# Patient Record
Sex: MALE | Race: WHITE | NOT HISPANIC OR LATINO | Employment: OTHER | URBAN - METROPOLITAN AREA
[De-identification: names, ages, dates, MRNs, and addresses within clinical notes are randomized per-mention and may not be internally consistent; named-entity substitution may affect disease eponyms.]

---

## 2023-12-21 ENCOUNTER — APPOINTMENT (OUTPATIENT)
Dept: ORTHOPEDIC SURGERY | Facility: CLINIC | Age: 63
End: 2023-12-21

## 2024-01-30 ENCOUNTER — PATIENT MESSAGE (OUTPATIENT)
Dept: ORTHOPEDIC SURGERY | Facility: CLINIC | Age: 64
End: 2024-01-30
Payer: MEDICARE

## 2024-01-30 DIAGNOSIS — M76.32 IT BAND SYNDROME, LEFT: Primary | ICD-10-CM

## 2024-02-01 ENCOUNTER — TELEPHONE (OUTPATIENT)
Dept: ORTHOPEDIC SURGERY | Facility: HOSPITAL | Age: 64
End: 2024-02-01
Payer: MEDICARE

## 2024-02-01 RX ORDER — LISINOPRIL 20 MG/1
TABLET ORAL
COMMUNITY

## 2024-02-01 RX ORDER — CHOLECALCIFEROL (VITAMIN D3) 25 MCG
1 TABLET ORAL DAILY
COMMUNITY

## 2024-02-01 RX ORDER — NAPROXEN SODIUM 220 MG/1
TABLET, FILM COATED ORAL
COMMUNITY

## 2024-02-01 RX ORDER — PANTOPRAZOLE SODIUM 20 MG/1
TABLET, DELAYED RELEASE ORAL
COMMUNITY
Start: 2020-02-25

## 2024-02-01 RX ORDER — VALACYCLOVIR HYDROCHLORIDE 1 G/1
TABLET, FILM COATED ORAL
COMMUNITY
Start: 2019-06-19

## 2024-02-01 RX ORDER — AMITRIPTYLINE HYDROCHLORIDE 25 MG/1
TABLET, FILM COATED ORAL
COMMUNITY
Start: 2020-06-25

## 2024-02-01 RX ORDER — LISINOPRIL 10 MG/1
TABLET ORAL
COMMUNITY
Start: 2019-04-04

## 2024-02-01 RX ORDER — METHOCARBAMOL 750 MG/1
TABLET, FILM COATED ORAL
COMMUNITY
Start: 2020-03-23

## 2024-02-01 RX ORDER — MULTIVIT-MIN/IRON FUM/FOLIC AC 7.5 MG-4
1 TABLET ORAL DAILY
COMMUNITY

## 2024-02-01 RX ORDER — MELOXICAM 15 MG/1
15 TABLET ORAL DAILY
Qty: 30 TABLET | Refills: 0 | OUTPATIENT
Start: 2024-02-01 | End: 2024-03-02

## 2024-02-01 RX ORDER — MUPIROCIN 20 MG/G
OINTMENT TOPICAL
COMMUNITY
Start: 2020-02-06

## 2024-02-01 RX ORDER — MELOXICAM 15 MG/1
TABLET ORAL
COMMUNITY
Start: 2021-03-19 | End: 2024-02-01 | Stop reason: SDUPTHER

## 2024-02-01 RX ORDER — DOCUSATE SODIUM 100 MG/1
CAPSULE, LIQUID FILLED ORAL
COMMUNITY
Start: 2020-02-25

## 2024-02-01 RX ORDER — TRIAMCINOLONE ACETONIDE 40 MG/ML
INJECTION, SUSPENSION INTRA-ARTICULAR; INTRAMUSCULAR
COMMUNITY
Start: 2013-11-03

## 2024-02-01 RX ORDER — GEMFIBROZIL 600 MG/1
TABLET, FILM COATED ORAL
COMMUNITY
Start: 2013-04-08

## 2024-02-01 RX ORDER — GABAPENTIN 600 MG/1
1 TABLET ORAL 3 TIMES DAILY
COMMUNITY
Start: 2020-09-09

## 2024-02-01 RX ORDER — ATORVASTATIN CALCIUM 80 MG/1
TABLET, FILM COATED ORAL
COMMUNITY
Start: 2019-04-04

## 2024-02-01 RX ORDER — FELODIPINE 10 MG/1
TABLET, EXTENDED RELEASE ORAL
COMMUNITY
Start: 2019-04-04

## 2024-02-01 RX ORDER — METFORMIN HYDROCHLORIDE 850 MG/1
TABLET ORAL
COMMUNITY

## 2024-02-01 RX ORDER — MELOXICAM 15 MG/1
15 TABLET ORAL DAILY
Qty: 30 TABLET | Refills: 0 | Status: SHIPPED | OUTPATIENT
Start: 2024-02-01 | End: 2024-03-02

## 2024-02-01 RX ORDER — LISINOPRIL AND HYDROCHLOROTHIAZIDE 20; 25 MG/1; MG/1
TABLET ORAL
COMMUNITY
Start: 2013-07-11

## 2024-04-09 ENCOUNTER — TELEPHONE (OUTPATIENT)
Dept: ORTHOPEDIC SURGERY | Facility: HOSPITAL | Age: 64
End: 2024-04-09

## 2024-04-09 ENCOUNTER — OFFICE VISIT (OUTPATIENT)
Dept: ORTHOPEDIC SURGERY | Facility: HOSPITAL | Age: 64
End: 2024-04-09
Payer: MEDICARE

## 2024-04-09 ENCOUNTER — HOSPITAL ENCOUNTER (OUTPATIENT)
Dept: RADIOLOGY | Facility: HOSPITAL | Age: 64
Discharge: HOME | End: 2024-04-09
Payer: MEDICARE

## 2024-04-09 DIAGNOSIS — M76.32 IT BAND SYNDROME, LEFT: Primary | ICD-10-CM

## 2024-04-09 DIAGNOSIS — G89.29 CHRONIC PAIN OF LEFT KNEE: ICD-10-CM

## 2024-04-09 DIAGNOSIS — M25.562 CHRONIC PAIN OF LEFT KNEE: ICD-10-CM

## 2024-04-09 DIAGNOSIS — M25.562 LEFT KNEE PAIN, UNSPECIFIED CHRONICITY: ICD-10-CM

## 2024-04-09 PROCEDURE — 73564 X-RAY EXAM KNEE 4 OR MORE: CPT | Mod: LT

## 2024-04-09 PROCEDURE — 1159F MED LIST DOCD IN RCRD: CPT | Performed by: FAMILY MEDICINE

## 2024-04-09 PROCEDURE — 99214 OFFICE O/P EST MOD 30 MIN: CPT | Performed by: FAMILY MEDICINE

## 2024-04-09 PROCEDURE — 73564 X-RAY EXAM KNEE 4 OR MORE: CPT | Mod: LEFT SIDE | Performed by: RADIOLOGY

## 2024-04-09 RX ORDER — MELOXICAM 15 MG/1
15 TABLET ORAL DAILY
Qty: 30 TABLET | Refills: 1 | OUTPATIENT
Start: 2024-04-09 | End: 2024-06-08

## 2024-04-09 RX ORDER — MELOXICAM 15 MG/1
15 TABLET ORAL DAILY
Qty: 30 TABLET | Refills: 1 | Status: SHIPPED | OUTPATIENT
Start: 2024-04-09 | End: 2024-06-08

## 2024-04-09 NOTE — PROGRESS NOTES
Sports Medicine Office Note    Today's Date:  04/09/2024     HPI: Asif Villarreal is a 63 y.o. 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     On 1/15/2021 he presented for evaluation of bilateral knee pain for possible ultrasound-guided injections upon referral by Dr. Perkins and his NP. Leonidas has been a competitive and recreational athlete for most of his life and has had early onset arthrosis of both knees. 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 He has had significant postoperative pain at the left knee, specifically at the IT band since his surgery. He has good friends with Dr. Blanchard who recently injected his right knee for arthritis. He got minimal relief from that injection. He has been diagnosed with CPRS and is receiving sympathetic blocks by Dr. Sewell. The first 1 gave him 6 to 7 weeks of complete pain relief and then his pain immediately returned. The second block gave him no benefit whatsoever. He recently underwent an MRI of the left knee for further evaluation of his chronic pain status post knee replacement. He has seen Brice Blanchard and Oumar both for additional opinions. Dr. Perkins also recommended that he see Dr. Huitron at [facility] for another opinion. He has not had any treatments at his iliotibial band. It sounds like he had hyper aggressive postoperative therapy when he went to PT last year. He has not had any recent therapy. He is very frustrated with his chronic pain and he reports it is debilitating. We had a long discussion regarding his chronic pain and agreed that he has severely tight iliotibial bands and tendinitis. He also has significant CRPS which seems to be incompletely treated at this time. We agreed upon a local cortisone injection at the distal left IT band. This produced immediate symptom relief and he had decreased allodynia, hyperalgesia and improved range of motion  without pain. We also agreed upon repeating a intra-articular cortisone injection at the right knee which also gave him great pain relief. He was extremely happy with his decreased pain and increased range of motion before leaving the exam room. He was able to walk without pain which she had not done for some time. We agreed to check inflammatory markers and a lab order was given. I strongly recommend that he start formal physical therapy and gave him an order, but I would like for him to check with Dr. Perkins to make sure he is okay with this recommendation. I also think that if his CRPS symptoms flare back up then he should definitely follow-up with Dr. Sewell. I will see him back in 4 weeks for recheck.     On 2/5/2021, he returns returns for 3-week follow-up of bilateral knee pain, left worse than right. Overall he feels better but there is still soreness. They are not as bad as they were previously. The left knee is without stiffness. He is riding the bicycle and walking around the block. There is still a large area that sensitive to touch around the lateral knee. He reports his right knee is still doing great from the cortisone injection. He went to one formal physical therapy setting and was advised to do home exercises. The therapist had emailed me with this recommendation. We also discussed his previous treatments and he does have gabapentin 600 mg but he only takes it occasionally. He denies interval injury or trauma. We discussed that he has had significant improvement with our conservative treatment plan since his last visit. He has responded very well to the cortisone injections both inside the right knee joint and outside the left knee. Still very concerned that he has complex regional pain syndrome at the left knee/CRPS and strongly recommend that he restart gabapentin daily and go back to formal physical therapy for treatment for this problem. I will email his therapist and if he does not treat  this we will find someone who will. I think this is important that we address through tactile desensitization. The right knee is doing very well. He understands that the cortisone injection can be repeated every 3 months or later as needed. His labs are all normal. He will follow-up in the next 1 month or sooner as needed.     On 3/19/2021, he returns for a 6-week follow-up of bilateral knee pain, left worse than right. Overall he feels much better than his previous visits. He reports the right knee is 50% better and the left knee is 20% better today. He was feeling better earlier in the week after having therapy. He feels the occupational therapy for the CRPS is helping greatly. He is taking the gabapentin 300 mg twice dailyâ€“3 times daily. He is not taking any anti-inflammatory at this time. His biggest complaint is swelling in his legs. When he wears compressive stockings this helps greatly. He recently saw his PCP and had blood work and was told that he did not have any arteriovascular disease. He denies interval injury or trauma. We agreed that he is doing much better than when I first met him in January. He has responded very well to her current conservative treatment plan. We need to get him on an anti-inflammatory and this was sent to the pharmacy. He will continue on the gabapentin. He will continue with occupational therapy and home exercises. Activity modifications were reviewed. I do feel that he has some type of venous insufficiency causing peripheral edema in both legs and that he should talk to his PCP at the [facility] about this. He might need to be on a diuretic or other medication. His labs are not available for my review.      On 6/17/2021, he returns for 3-month follow-up of chronic bilateral knee pain, left worse than right. He reports that his pain is gradually worsened over the last few weeks. The previous cortisone injections gave him 5+ months of great relief. He has been taking the gabapentin  and meloxicam as instructed. This is helping him sleep at night. He is wearing the compression socks at both legs. He denies interval injury or trauma. He is requesting analgesia for his chronic knee pain. We agreed to repeat a cortisone injection into his right knee to help with his chronic pain. He tolerated this well. Activity modifications were reviewed. He understands that this can be repeated every 3 months or later. We may want to consider viscosupplementation in the future.  Regarding his chronic left knee pain status post TKA, he understands I will not give him cortisone into the joint. We agreed that he does not need a repeat injection at the distal IT band at this time. He will continue with his oral meloxicam, oral gabapentin, and topical diclofenac that seems to be helping his symptoms. I strongly encouraged him to continue his treatment plan for CRPS as he is doing much better now than he was 6 months ago. I am happy to see him back in the next 3 months or as needed.     On 10/26/2021, he returns for recheck of chronic bilateral knee pain status post left knee replacement. The previous cortisone injection at the right knee given on 6/17/2021 has worn off and his pain has returned. He is having swelling. He is also getting pain down the distal IT band at the right. The left knee pain has returned but not nearly as bad as before his injection in January 2021. Previous cortisone injection gave him great relief for over 6 weeks and is now down to 60 to 65%. He is starting to get pain down the IT band. He would like injections at both knees. He would also like to come off his gabapentin if possible. He needs a refill of his meloxicam. He denies interval injury or trauma. We agreed to repeat a cortisone injection into his right knee to help with his chronic pain. He tolerated this well. Activity modifications were reviewed. He understands that this can be repeated every 3 months or later. We may want to  consider viscosupplementation in the future before ultimately undergoing knee replacement.  Regarding his chronic left knee pain status post TKA, we reviewed and he understands I will not give him cortisone into the joint. We agreed to repeat injection at the distal IT band at this time. Tolerated this well. He will continue with his oral meloxicam, oral gabapentin, and topical diclofenac that seems to be helping his symptoms. We agreed that his CRPS has improved but I do not feel that it has completely resolved upon his exam findings today. We will try a gradual stepdown of his gabapentin      On 7/14/2022, he returns for 9-month follow-up of chronic bilateral knee pain. He is requesting repeat treatment with cortisone injection similar to his previous visits. He is still having lateral left knee postoperative pain at the distal IT band. The right knee DJD is also flaring up with pain. He is scheduled to have replacement with Dr. Perkins in November. Today he would like long-term analgesia. He denies interval injury or trauma. He still playing golf regularly. He did stop the gabapentin 2 months ago that was helping his CRPS type I which has improved greatly. He is not having any of the allodynia or hyperalgesia.   We agreed to repeat a cortisone injection into his right knee to help with his chronic pain. He tolerated this well. Activity modifications were reviewed. He understands that this can be repeated every 3 months or later. He is scheduled in November 2022 to undergo knee replacement.  Regarding his chronic left knee pain status post TKA, we reviewed and he understands I will not give him cortisone into the joint. We agreed to repeat injection at the distal IT band at this time. He tolerated this well. He is doing well without gabapentin for the CRPS which, hopefully has resolved. We discussed PRP at the distal IT band and he would be an eligible candidate. He understands that this is not covered by  insurance and would be an out-of-pocket expense. He would like to consider this in early September after today's injection calms down his pain.  I am happy to see him back in the next 3 to 4 months or sooner as needed.     On 9/9/2022, he returns for follow-up of chronic left lateral knee pain status post total knee replacement and with chronic iliotibial band tendinitis, and to begin a trial of PRP to the area. He understands this is not covered by insurance and is an out-of-pocket expense. He reports the previous cortisone injection at his right knee at his last visit helped him greatly. The cortisone injection around the distal IT band at his last visit also helped greatly. This to help getting through his child's wedding celebration. He is now wanting to attempt long-term analgesia at the distal lateral right knee. He denies interval injury or trauma. He has continued to play golf and with Dr. Blanchard.   We agreed to start PRP treatment today. We were successful obtaining blood specimen out of the left antecubital fossa. PRP was injected into the lateral left knee around the distal iliotibial band without any complications. We reviewed the post procedure protocol including nonweightbearing, crutch assisted ambulation for 2 days and then progressing off the crutches as tolerated. He will follow-up in 6 weeks for recheck.     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 He is very happy with his improvement. He reports she is 85% better with her PRP treatment. He denies interval injury or trauma. He is scheduled to undergo right knee replacement within the next 10 days with Dr. Perkins.  We agreed that he responded very well to PRP injection over the lateral left knee. We discussed that this could possibly be repeated in the future depending on how long he gets relief. He will  continue his current conservative treatment plan otherwise. He will undergo right knee replacement with Dr. Perkins soon. I am happy to see him back when his pain returns.     Today, 4/9/2024, he returns for an 18-month follow-up of chronic lateral knee and thigh pain with distal IT band symptoms that we have treated successfully in the past with PRP.  He reports 16 to 18 months of great improvement after the PRP injection on 10/21/2022 for his chronic recalcitrant distal IT band pain status post left knee replacement.  He has been very active and playing lots of golf.  The pain has returned over the last 1 to 2 months without injury or trauma.  He reports meloxicam has helped greatly in addition to the shot, this gives him 50 to 70% improvement while taking it but he is out of the medicine.  He denies direct injury or trauma.  He has no problems with the right knee status post replacement with Dr. Perkins.    He has no other complaints.    Physical Examination:     The LEFT knee has trace joint effusion but is otherwise without obvious signs of acute bony deformity, swelling, erythema, or ecchymosis. The patella is no longer tender to light touch. The medial joint line is no longer tender and without bony crepitus or step-off. The lateral joint line is mildly tender and without bony crepitus or step-off. Flexion & extension are limited and painful. There is no obvious instability with stress testing.     Imaging:  Radiographs of the left knee obtained today were reviewed and revealed no acute bony abnormalities.  Prosthetic components are intact and anatomic.  These were compared to previous x-rays from 2022.  The studies were reviewed by me personally in the office today.    === 12/16/22 ===  KNEE  - Impression -  No hardware failure about right total knee arthroplasty.  Moderate-sized effusion      Problem List Items Addressed This Visit    None  Visit Diagnoses         Codes    It band syndrome, left    -   Primary M76.32    Relevant Orders    Referral to Physical Therapy    Chronic pain of left knee     M25.562, G89.29    Relevant Medications    meloxicam (Mobic) 15 mg tablet    Other Relevant Orders    XR knee left 4+ views    Referral to Physical Therapy    DANIEL with Reflex to ROBERT    CBC and Auto Differential    C-Reactive Protein    Sedimentation Rate    Rheumatoid Factor            Assessment and Plan:     We reviewed the exam and x-ray findings and discussed the conservative and surgical treatment options. We agreed to treat him conservatively with a referral to formal physical therapy and to restart meloxicam daily.  We did review the possibility of repeating PRP and I explained to him that the price has gone up but also the current version gives a 2-3 times the amount of concentrated blood products available to help with his symptoms.  He understands this is still not covered by insurance and would be an out-of-pocket expense.  I like for him to wait a minimum of 6 to 8 weeks before we proceed with this option.  He also asked about this being inflammatory arthritis; Dr. Blanchard mention to him about this several times.  He would like to get lab work checked.  Will go ahead and obtain labs and he can follow-up with his PCP or rheumatology as needed.    **This note was dictated using Dragon speech recognition software and was not corrected for spelling or grammatical errors**.    Lloyd Perez MD  Sports Medicine Specialist  [facility] [place]

## 2024-04-09 NOTE — TELEPHONE ENCOUNTER
Patient just called and his rx was sent to the [place] pharmacy from his last rx no problem I added a new one to his local pharmacy,?

## 2024-04-16 ENCOUNTER — LAB (OUTPATIENT)
Dept: LAB | Facility: LAB | Age: 64
End: 2024-04-16
Payer: MEDICARE

## 2024-04-16 DIAGNOSIS — G89.29 CHRONIC PAIN OF LEFT KNEE: ICD-10-CM

## 2024-04-16 DIAGNOSIS — M25.562 CHRONIC PAIN OF LEFT KNEE: ICD-10-CM

## 2024-04-16 LAB
BASOPHILS # BLD AUTO: 0.06 X10*3/UL (ref 0–0.1)
BASOPHILS NFR BLD AUTO: 0.8 %
CRP SERPL-MCNC: 0.11 MG/DL
EOSINOPHIL # BLD AUTO: 0.22 X10*3/UL (ref 0–0.7)
EOSINOPHIL NFR BLD AUTO: 3 %
ERYTHROCYTE [DISTWIDTH] IN BLOOD BY AUTOMATED COUNT: 12.9 % (ref 11.5–14.5)
ERYTHROCYTE [SEDIMENTATION RATE] IN BLOOD BY WESTERGREN METHOD: 11 MM/H (ref 0–20)
HCT VFR BLD AUTO: 44 % (ref 41–52)
HGB BLD-MCNC: 14.3 G/DL (ref 13.5–17.5)
IMM GRANULOCYTES # BLD AUTO: 0.03 X10*3/UL (ref 0–0.7)
IMM GRANULOCYTES NFR BLD AUTO: 0.4 % (ref 0–0.9)
LYMPHOCYTES # BLD AUTO: 1.87 X10*3/UL (ref 1.2–4.8)
LYMPHOCYTES NFR BLD AUTO: 25.3 %
MCH RBC QN AUTO: 29.6 PG (ref 26–34)
MCHC RBC AUTO-ENTMCNC: 32.5 G/DL (ref 32–36)
MCV RBC AUTO: 91 FL (ref 80–100)
MONOCYTES # BLD AUTO: 0.82 X10*3/UL (ref 0.1–1)
MONOCYTES NFR BLD AUTO: 11.1 %
NEUTROPHILS # BLD AUTO: 4.38 X10*3/UL (ref 1.2–7.7)
NEUTROPHILS NFR BLD AUTO: 59.4 %
NRBC BLD-RTO: 0 /100 WBCS (ref 0–0)
PLATELET # BLD AUTO: 299 X10*3/UL (ref 150–450)
RBC # BLD AUTO: 4.83 X10*6/UL (ref 4.5–5.9)
RHEUMATOID FACT SER NEPH-ACNC: <10 IU/ML (ref 0–15)
WBC # BLD AUTO: 7.4 X10*3/UL (ref 4.4–11.3)

## 2024-04-16 PROCEDURE — 85025 COMPLETE CBC W/AUTO DIFF WBC: CPT

## 2024-04-16 PROCEDURE — 86140 C-REACTIVE PROTEIN: CPT

## 2024-04-16 PROCEDURE — 86431 RHEUMATOID FACTOR QUANT: CPT

## 2024-04-16 PROCEDURE — 85652 RBC SED RATE AUTOMATED: CPT

## 2024-04-16 PROCEDURE — 86038 ANTINUCLEAR ANTIBODIES: CPT

## 2024-04-16 PROCEDURE — 36415 COLL VENOUS BLD VENIPUNCTURE: CPT

## 2024-04-17 LAB — ANA SER QL HEP2 SUBST: NEGATIVE

## 2024-04-25 ENCOUNTER — EVALUATION (OUTPATIENT)
Dept: PHYSICAL THERAPY | Facility: CLINIC | Age: 64
End: 2024-04-25
Payer: MEDICARE

## 2024-04-25 DIAGNOSIS — G89.29 CHRONIC PAIN OF LEFT KNEE: ICD-10-CM

## 2024-04-25 DIAGNOSIS — M76.32 IT BAND SYNDROME, LEFT: Primary | ICD-10-CM

## 2024-04-25 DIAGNOSIS — M25.562 CHRONIC PAIN OF LEFT KNEE: ICD-10-CM

## 2024-04-25 PROCEDURE — 97110 THERAPEUTIC EXERCISES: CPT | Mod: GP

## 2024-04-25 PROCEDURE — 97162 PT EVAL MOD COMPLEX 30 MIN: CPT | Mod: GP

## 2024-04-25 ASSESSMENT — PAIN - FUNCTIONAL ASSESSMENT: PAIN_FUNCTIONAL_ASSESSMENT: 0-10

## 2024-04-25 ASSESSMENT — ENCOUNTER SYMPTOMS
DEPRESSION: 0
OCCASIONAL FEELINGS OF UNSTEADINESS: 0
LOSS OF SENSATION IN FEET: 0

## 2024-04-25 ASSESSMENT — PAIN SCALES - GENERAL: PAINLEVEL_OUTOF10: 4

## 2024-04-25 NOTE — PROGRESS NOTES
Physical Therapy  Physical Therapy Orthopedic Evaluation    Patient Name: Asif Villarreal  MRN: [id number]  Today's Date: 4/25/2024  Time Calculation  Start Time: 0815  Stop Time: 0900  Time Calculation (min): 45  PT Evaluation Time Entry  PT Evaluation (Mod) Time Entry: 30  PT Therapeutic Procedures Time Entry  Therapeutic Exercise Time Entry: 10  Manual Therapy: 5                   Insurance:  Visit number: 1 of [place]  Authorization info: No auth  Insurance Type: Payor: MEDICARE / Plan: MEDICARE PART A AND B / Product Type: *No Product type* /      Current Problem  1. It band syndrome, left  Referral to Physical Therapy    Follow Up In Physical Therapy      2. Chronic pain of left knee  Referral to Physical Therapy    Follow Up In Physical Therapy          Referred by: Dr. Perez    General:  General  Reason for Referral: Left knee pain  Referred By: Dr. Perez    Precautions:  Precautions  Precautions Comment: CRPS history  Medical History Form: Reviewed (scanned into chart)    Subjective:   Subjective   Chief Complaint: patient presents w/ L knee pain along the lateral aspect. Has a PMH of CRPS post L TKA. He has done much better in recent months since he started receiving PRP injections. Recently purchased a home in [place] and has been golfing [place] west. About 1-2 months ago he started to experience pain and stiffness in his left lateral knee. Does not recall a new DOC and saw Dr. Perez on 4/9.   Onset: 02/01/2024 approximate date  DOC: insidious    Current Condition:   Better    Pain:  Pain Assessment: 0-10  Pain Score: 4  Location: L knee  Description: stiffness  Aggravating Factors:  Bending/Straightening Knee, weather change since coming back to [place].  Relieving Factors:  meloxicam     Relevant Information (PMH & Previous Tests/Imaging): B TKA, CRPS left knee.   Previous Interventions/Treatments: Physical Therapy    Prior Level of Function (PLOF)  Patient previously independent with all  ADLs  Exercise/Physical Activity: golf, walking and rec sports   Work/School: retired    Patients Living Environment: Reviewed and no concern  Primary Language: English  Patient's Goal(s) for Therapy: eliminate pain     Red Flags: Do you have any of the following? No  Fever/chills, unexplained weight changes, dizziness/fainting, unexplained change in bowel or bladder functions, unexplained malaise or muscle weakness, night pain/sweats, numbness or tingling    Objective:    ROM    Hip AROM (Degrees)      (R)  (L)  Flexion: wfl  wfl   Extension: wfl  wfl     Abduction: wfl  wfl     Adduction: wfl  wfl     ER:  wfl  wfl     IR:  wfl  wfl         Knee AROM (Degrees)      (R)  (L)  Flexion: 125  115      Extension: +2  +2         Knee PROM (Degrees)      (R)  (L)  Flexion: 125  115*      Extension: +2  +2     *guarding/empty end feel    Ankle AROM (Degrees)      (R)  (L)  Plantarflexion: wfl  wfl      Dorsiflexion: wfl  wfl     Inversion: wfl  wfl      Eversion: wfl  wfl            Strength Testing    Hip    (R)  (L)  Flexion: 5/5  5/5      Extension: 5/5  5/5     Abduction: 5/5  4/5 + pain            Knee    (R)  (L)  Flexion: 5/5  5/5      Extension: 5/5  5/5 + pain           Functional Screening  Deferred   Squat:   Lunge:   SL Balance:   Lateral Step Down:   SL Quarter Squat:       Posture: kyphotic       Palpation: TTP over ITB insertion and along entire aspect up to insertion at hip.       Flexibility:   Elys: Pos L due to hips lifting off table  Eron:   Obers: Pos B due to hips not reaching parallel  Mirta: Neg B    Patella Mobility: Min-mod limitation in all directions LLE that improved w/ mobs      Observation: Mild/moderate LLE ankle edema. Increased tone throughout LLE. Patient states that he feels his muscle bulk is unusual for his age and that he does not work out his lower body w/ weights.     Gait: flexed trunk         Special Tests  Estelle compression: Pos due to pain reproduction over ITB  insertion.      Outcome Measures:  Other Measures  Lower Extremity Funtional Score (LEFS): 36/80     Treatments:    There-ex: 10'  Supine ankle pumps 2x20*  Supine IT band SLR stretch w/ strap assist 4x30'*  Hooklying hip abduction w/ blute TB 2x20*    * = added to HEP.  Sheet with exercise descriptions and images issued.  Skilled intervention utilized in the appropriate selection & application of above exercises.  Verbal and tactile cues provided for proper form and technique.  Pt. demonstrated appropriate form & verbalized understanding of optimal technique for above exercises.     Manual: 5'  PFJ mobs all directions    EDUCATION: Home exercise program, plan of care, activity modifications, pain management, and injury pathology       Assessment: Patient presents with signs and symptoms consistent with left IT band syndrome, resulting in limited participation in pain-free ADLs and inability to perform at their prior level of function. Pt would benefit from physical therapy to address the impairments found & listed previously in the objective section in order to return to safe and pain-free ADLs and prior level of function.       Plan:  PT Plan: Skilled PT  PT Frequency: 1 time per week  Duration: 4  Onset Date: 04/25/24  Certification Period Start Date: 07/24/24  Rehab Potential: Fair (due to hx of CRPS)  Plan of Care Agreement: Patient  Planned Interventions include: therapeutic exercise, self-care home management, manual therapy, therapeutic activities, gait training, neuromuscular coordination, vasopneumatic, dry needling, aquatic therapy  Frequency: 1 x Week  Duration: 4 Weeks  Goals: Set and discussed today  Active       Left IT band syndrome       STGs       Start:  04/25/24    Expected End:  05/23/24       1. Patient will demonstrate independence w/ HEP to allow for self-management of symptoms  2. Patient will demonstrate increased strength during hip abduction and knee extension MMT to 5/5+ pain free to  improve ADL performance.   3. Patient will demonstrate improved knee flexion ROM to equal unaffected side + pain free to improve ADLs.   4. Patient will report a decrease in pain by at least 2 points to meet the MCID  5. Patient will tolerate golfing 9 holes w/ a reported decrease in symptoms to progress towards long term goal          LTGs       Start:  04/25/24    Expected End:  06/20/24       1. Patient will demonstrate improved flexibility by having a negative Obers test B to decrease tension on the IT band during functional mobility.   2. Patient will be able to reciprocally negotiate stairs w/o a reproduction of familiar symptoms to improve ADL performance and demonstrate an improved capacity for functional mobility.   3. Patient will report an improved score on LEFS by at least 9 points to meet the MDC  4. Patient will demonstrate improved quadriceps flexibility by having a negative Ely's test to reduce knee tension and improve functional mobility.   5. Patient will tolerate golfing a full round of 18 w/o a return of familiar symptoms to demonstrate a return to PLOF.                   Plan of care was developed with input and agreement by the patient      Chevy Alfonso, PT, ATC

## 2024-05-06 ENCOUNTER — OFFICE VISIT (OUTPATIENT)
Dept: ORTHOPEDIC SURGERY | Facility: CLINIC | Age: 64
End: 2024-05-06
Payer: MEDICARE

## 2024-05-06 VITALS — HEIGHT: 72 IN | WEIGHT: 270 LBS | BODY MASS INDEX: 36.57 KG/M2

## 2024-05-06 DIAGNOSIS — M65.331 TRIGGER FINGER, RIGHT MIDDLE FINGER: ICD-10-CM

## 2024-05-06 DIAGNOSIS — M65.332 TRIGGER FINGER, LEFT MIDDLE FINGER: ICD-10-CM

## 2024-05-06 DIAGNOSIS — G56.03 CARPAL TUNNEL SYNDROME, BILATERAL: Primary | ICD-10-CM

## 2024-05-06 PROCEDURE — 20550 NJX 1 TENDON SHEATH/LIGAMENT: CPT | Performed by: ORTHOPAEDIC SURGERY

## 2024-05-06 PROCEDURE — 99213 OFFICE O/P EST LOW 20 MIN: CPT | Performed by: ORTHOPAEDIC SURGERY

## 2024-05-06 PROCEDURE — 1159F MED LIST DOCD IN RCRD: CPT | Performed by: ORTHOPAEDIC SURGERY

## 2024-05-06 PROCEDURE — 1036F TOBACCO NON-USER: CPT | Performed by: ORTHOPAEDIC SURGERY

## 2024-05-06 PROCEDURE — 20526 THER INJECTION CARP TUNNEL: CPT | Performed by: ORTHOPAEDIC SURGERY

## 2024-05-06 RX ORDER — LIDOCAINE HYDROCHLORIDE 10 MG/ML
0.5 INJECTION INFILTRATION; PERINEURAL
Status: COMPLETED | OUTPATIENT
Start: 2024-05-06 | End: 2024-05-06

## 2024-05-06 RX ORDER — TRIAMCINOLONE ACETONIDE 40 MG/ML
20 INJECTION, SUSPENSION INTRA-ARTICULAR; INTRAMUSCULAR
Status: COMPLETED | OUTPATIENT
Start: 2024-05-06 | End: 2024-05-06

## 2024-05-06 RX ADMIN — TRIAMCINOLONE ACETONIDE 20 MG: 40 INJECTION, SUSPENSION INTRA-ARTICULAR; INTRAMUSCULAR at 18:11

## 2024-05-06 RX ADMIN — LIDOCAINE HYDROCHLORIDE 0.5 ML: 10 INJECTION INFILTRATION; PERINEURAL at 18:11

## 2024-05-06 ASSESSMENT — PAIN DESCRIPTION - DESCRIPTORS: DESCRIPTORS: ACHING;BURNING;SORE

## 2024-05-06 ASSESSMENT — PAIN - FUNCTIONAL ASSESSMENT: PAIN_FUNCTIONAL_ASSESSMENT: 0-10

## 2024-05-06 ASSESSMENT — PAIN SCALES - GENERAL: PAINLEVEL_OUTOF10: 5 - MODERATE PAIN

## 2024-05-06 NOTE — PROGRESS NOTES
[facility]  Hand and Upper Extremity Service  Follow up visit         Follow up for: Bilateral hands     Interval History: Bilateral trigger finger injections received on last visit and did really well. Around 3/2024 his symptoms have returned and interfering with his ability to golf. He's noticed numbness in index fingers bilaterally and frequent sleep disturbances. The numbness issues are new and have not been previously discussed.               Past medical history, medications, allergies, surgical history and review of systems are reviewed and otherwise unchanged when compared to last visit on 8/7/23         Examination:  Constitutional: Oriented to person, place, and time.  Appears well-developed and well-nourished.  Head: Normocephalic and atraumatic.  Eyes: Pupils are equal, round, and reactive to light.  Cardiovascular: Intact distal pulses.  Pulmonary/Chest/Breast: Effort normal. No respiratory distress.  Neurological: Alert and oriented to person, place, and time.  Skin: Skin is warm and dry.  Psychiatric: normal mood and affect.  Behavior is normal.  Musculoskeletal: Bilateral hands reveal palmar swelling over middle finger A1 pulleys with exquisite tenderness to palpation. No significant tenderness over ring finger or index finger pulleys. Unable to bend middle fingers enough actively to demonstrate triggering but has full active flexion of index, ring, and small finger bilaterally. No thenar atrophy. Positive Nereida medial nerve compression test and positive Tinel's sign bilaterally.               Personal Interpretation of Diagnostic studies: No new images obtained                Impression: Bilateral middle finger trigger fingers and bilateral carpal tunnel syndrome          Plan: He was really happy with injections that helped him last year and would like repeat injections today. He'll monitor his symptoms following injections but if he continues to have symptoms we  may consider middle finger trigger finger and carpal tunnel release surgery because he does not want to continue painful injections indefinitely.           In Office Procedures Performed: Bilateral middle finger trigger finger injections and bilateral carpal tunnel injections   Hand / UE Inj/Asp: bilateral carpal tunnel for carpal tunnel syndrome on 5/6/2024 6:11 PM  Indications: pain and therapeutic  Details: 25 G needle, volar approach  Medications (Right): 20 mg triamcinolone acetonide 40 mg/mL; 0.5 mL lidocaine 10 mg/mL (1 %)  Medications (Left): 20 mg triamcinolone acetonide 40 mg/mL; 0.5 mL lidocaine 10 mg/mL (1 %)  Outcome: tolerated well, no immediate complications  Procedure, treatment alternatives, risks and benefits explained, specific risks discussed. Consent was given by the patient. Immediately prior to procedure a time out was called to verify the correct patient, procedure, equipment, support staff and site/side marked as required. Patient was prepped and draped in the usual sterile fashion.       Hand / UE Inj/Asp: bilateral long A1 for trigger finger on 5/6/2024 6:11 PM  Indications: tendon swelling and pain  Details: 25 G needle, volar approach  Medications (Right): 0.5 mL lidocaine 10 mg/mL (1 %); 20 mg triamcinolone acetonide 40 mg/mL  Medications (Left): 0.5 mL lidocaine 10 mg/mL (1 %); 20 mg triamcinolone acetonide 40 mg/mL  Outcome: tolerated well, no immediate complications  Procedure, treatment alternatives, risks and benefits explained, specific risks discussed. Consent was given by the patient. Immediately prior to procedure a time out was called to verify the correct patient, procedure, equipment, support staff and site/side marked as required. Patient was prepped and draped in the usual sterile fashion.                Medical Decision Making:          Medications Prescribed: None               Follow up: As needed             Fadi Young MD  [facility]  [place]  Department of Orthopaedic Surgery  Hand and Upper Extremity Reconstruction    Scribe Attestation  By signing my name below, I, Anthony Centeno   attest that this documentation has been prepared under the direction and in the presence of Dr. Fadi Young.    Dictation performed with the use of voice recognition software.  Syntax and grammatical errors may exist.

## 2024-05-07 ENCOUNTER — TREATMENT (OUTPATIENT)
Dept: PHYSICAL THERAPY | Facility: CLINIC | Age: 64
End: 2024-05-07
Payer: MEDICARE

## 2024-05-07 DIAGNOSIS — M76.32 IT BAND SYNDROME, LEFT: Primary | ICD-10-CM

## 2024-05-07 DIAGNOSIS — G89.29 CHRONIC PAIN OF LEFT KNEE: ICD-10-CM

## 2024-05-07 DIAGNOSIS — M25.562 CHRONIC PAIN OF LEFT KNEE: ICD-10-CM

## 2024-05-07 PROCEDURE — 97140 MANUAL THERAPY 1/> REGIONS: CPT | Mod: GP

## 2024-05-07 PROCEDURE — 97032 APPL MODALITY 1+ESTIM EA 15: CPT | Mod: GP

## 2024-05-07 PROCEDURE — 97110 THERAPEUTIC EXERCISES: CPT | Mod: GP

## 2024-05-07 ASSESSMENT — PAIN SCALES - GENERAL: PAINLEVEL_OUTOF10: 3

## 2024-05-07 ASSESSMENT — PAIN - FUNCTIONAL ASSESSMENT: PAIN_FUNCTIONAL_ASSESSMENT: 0-10

## 2024-05-07 NOTE — PROGRESS NOTES
Physical Therapy  Physical Therapy Treatment    Patient Name: Asif Villarreal  MRN: [id number]  Today's Date: 5/7/2024  Time Calculation  Start Time: 0745  Stop Time: 0830  Time Calculation (min): 45  PT Therapeutic Procedures Time Entry  Therapeutic Exercise Time Entry: 15  Manual Therapy: 15  Attended E-stim: 15                  Insurance:  Visit number: 2 of MN  Authorization info: No auth  Insurance Type: Payor: MEDICARE / Plan: MEDICARE PART A AND B / Product Type: *No Product type* /      Current Problem  1. It band syndrome, left        2. Chronic pain of left knee              Referred by: Dr. Perez    General:  General  Reason for Referral: Left knee pain  Referred By: Dr. Perez    Precautions:  Precautions  Precautions Comment: CRPS history  Medical History Form: Reviewed (scanned into chart)    Subjective:   Subjective   Patient reports he has been doing much better since the time of his initial eval. Feels the stretches and mobility work has been helping, believes he is about 60% better. Has c/o posterior knee tightness and tenderness over ITB insertion.     Pain:  Pain Assessment: 0-10  Pain Score: 3      Objective:    ROM    Hip AROM (Degrees)      (R)  (L)  Flexion: wfl  wfl   Extension: wfl  wfl     Abduction: wfl  wfl     Adduction: wfl  wfl     ER:  wfl  wfl     IR:  wfl  wfl         Knee AROM (Degrees)      (R)  (L)  Flexion: 125  115      Extension: +2  +2         Knee PROM (Degrees)      (R)  (L)  Flexion: 125  115*      Extension: +2  +2     *guarding/empty end feel    Ankle AROM (Degrees)      (R)  (L)  Plantarflexion: wfl  wfl      Dorsiflexion: wfl  wfl     Inversion: wfl  wfl      Eversion: wfl  wfl            Strength Testing    Hip    (R)  (L)  Flexion: 5/5  5/5      Extension: 5/5  5/5     Abduction: 5/5  4/5 + pain            Knee    (R)  (L)  Flexion: 5/5  5/5      Extension: 5/5  5/5 + pain           Functional Screening  Deferred   Squat:   Lunge:   SL Balance:   Lateral Step Down:   SL  Quarter Squat:       Posture: kyphotic       Palpation: TTP over ITB insertion only. No longer TTP along proximal aspect towards origin of ITB.    Flexibility:   Elys: Pos L due to hips lifting off table  Eron:   Obers: Pos B due to hips not reaching parallel  Mirta: Neg B    Patella Mobility: Min-mod limitation in all directions LLE that improved w/ mobs      Observation: Mild LLE edema in ankle that has improved since last visit. Patient feels ankle pumps have been helping reduce edema.     Gait: flexed trunk         Special Tests  [place] compression: Pos due to pain reproduction over ITB insertion.      Outcome Measures:        Treatments:    There-ex: 15'  S/l clamshells w/ blue TB 2x15  Glut bridge on heels 2x15  Seated hip IR w/ adductor ball squeeze w/ 3' holds 2x12  Long arc quads 2x12    Manual: 15'  STM distal ITB, gentle to patient tolerance  PFJ mobs all directions    Thermoprobe: 15'  Level 5 heat, e-stim intensity 20  Time included for set up and to find appropriate dosage. Patient experienced a return of familiar pain w/ intensity of 25 and probe application directly over ITB insertion    EDUCATION: Home exercise program, plan of care, activity modifications, pain management, and injury pathology       Assessment:   Patient tolerated today's session w/ expected muscle fatigue and a reported reduction in tightness following session. Demonstrates improvements in sensitivity through manual and thermo probe tolerance w/ minimal production of familiar pain, and difficulty w/ clamshells and bridges due to muscle fatigue. Patient will benefit from ongoing PT services to progress strengthening and manual as tolerated to reach established goals to return to PLOF.        Plan:  PT Plan: Skilled PT  PT Frequency: 1 time per week  Duration: 4  Onset Date: 04/25/24  Certification Period Start Date: 07/24/24  Rehab Potential: Fair (due to hx of CRPS)  Plan of Care Agreement: PatientAssess response to increased  activity. Continue manual and thermo probe based on tolerance and progress strengthening as tolerated.     Goals: Set and discussed today  Active       Left IT band syndrome       STGs       Start:  04/25/24    Expected End:  05/23/24       1. Patient will demonstrate independence w/ HEP to allow for self-management of symptoms  2. Patient will demonstrate increased strength during hip abduction and knee extension MMT to 5/5+ pain free to improve ADL performance.   3. Patient will demonstrate improved knee flexion ROM to equal unaffected side + pain free to improve ADLs.   4. Patient will report a decrease in pain by at least 2 points to meet the MCID  5. Patient will tolerate golfing 9 holes w/ a reported decrease in symptoms to progress towards long term goal          LTGs       Start:  04/25/24    Expected End:  06/20/24       1. Patient will demonstrate improved flexibility by having a negative Obers test B to decrease tension on the IT band during functional mobility.   2. Patient will be able to reciprocally negotiate stairs w/o a reproduction of familiar symptoms to improve ADL performance and demonstrate an improved capacity for functional mobility.   3. Patient will report an improved score on LEFS by at least 9 points to meet the MDC  4. Patient will demonstrate improved quadriceps flexibility by having a negative Ely's test to reduce knee tension and improve functional mobility.   5. Patient will tolerate golfing a full round of 18 w/o a return of familiar symptoms to demonstrate a return to PLOF.                   Plan of care was developed with input and agreement by the patient      Chevy Alfonso, PT, ATC

## 2024-05-14 ENCOUNTER — TREATMENT (OUTPATIENT)
Dept: PHYSICAL THERAPY | Facility: CLINIC | Age: 64
End: 2024-05-14
Payer: MEDICARE

## 2024-05-14 DIAGNOSIS — G89.29 CHRONIC PAIN OF LEFT KNEE: ICD-10-CM

## 2024-05-14 DIAGNOSIS — M25.562 CHRONIC PAIN OF LEFT KNEE: ICD-10-CM

## 2024-05-14 DIAGNOSIS — M76.32 IT BAND SYNDROME, LEFT: ICD-10-CM

## 2024-05-14 PROCEDURE — 97032 APPL MODALITY 1+ESTIM EA 15: CPT | Mod: GP

## 2024-05-14 PROCEDURE — 97110 THERAPEUTIC EXERCISES: CPT | Mod: GP

## 2024-05-14 PROCEDURE — 97140 MANUAL THERAPY 1/> REGIONS: CPT | Mod: GP

## 2024-05-14 ASSESSMENT — PAIN - FUNCTIONAL ASSESSMENT: PAIN_FUNCTIONAL_ASSESSMENT: 0-10

## 2024-05-14 ASSESSMENT — PAIN SCALES - GENERAL: PAINLEVEL_OUTOF10: 2

## 2024-05-14 NOTE — PROGRESS NOTES
Physical Therapy  Physical Therapy Treatment    Patient Name: Asif Villarreal  MRN: [id number]  Today's Date: 5/14/2024  Time Calculation  Start Time: 0915  Stop Time: 1000  Time Calculation (min): 45  PT Therapeutic Procedures Time Entry  Therapeutic Exercise Time Entry: 15  Manual Therapy: 15  Attended E-stim: 15                  Insurance:  Visit number: 3 of MN  Authorization info: No auth  Insurance Type: Payor: MEDICARE / Plan: MEDICARE PART A AND B / Product Type: *No Product type* /      Current Problem  1. Chronic pain of left knee  Follow Up In Physical Therapy      2. It band syndrome, left  Follow Up In Physical Therapy            Referred by: Dr. Perez    General:  General  Reason for Referral: Left knee pain  Referred By: Dr. Perez    Precautions:  Precautions  Precautions Comment: CRPS history  Medical History Form: Reviewed (scanned into chart)    Subjective:   Subjective   Patient reports he is doing well overall, felt that the clamshells provoked his familiar pain. Feels that the soft tissue work and heat&e-stim combo have been very helpful. Leaves for [place] at the end of this week and will be back in June to check in.     Pain:  Pain Assessment: 0-10  Pain Score: 2      Objective:    ROM    Hip AROM (Degrees)      (R)  (L)  Flexion: wfl  wfl   Extension: wfl  wfl     Abduction: wfl  wfl     Adduction: wfl  wfl     ER:  wfl  wfl     IR:  wfl  wfl         Knee AROM (Degrees)      (R)  (L)  Flexion: 125  115      Extension: +2  +2         Knee PROM (Degrees)      (R)  (L)  Flexion: 125  115*      Extension: +2  +2     *guarding/empty end feel    Ankle AROM (Degrees)      (R)  (L)  Plantarflexion: wfl  wfl      Dorsiflexion: wfl  wfl     Inversion: wfl  wfl      Eversion: wfl  wfl            Strength Testing    Hip    (R)  (L)  Flexion: 5/5  5/5      Extension: 5/5  5/5     Abduction: 5/5  4/5 + pain            Knee    (R)  (L)  Flexion: 5/5  5/5      Extension: 5/5  5/5 + pain           Functional  "Screening  Deferred   Squat:   Lunge:   SL Balance:   Lateral Step Down:   SL Quarter Squat:       Posture: kyphotic       Palpation: TTP over ITB insertion only. No longer TTP along proximal aspect towards origin of ITB.    Flexibility:   Elys: Pos L due to hips lifting off table  Eron:   Obers: Pos B due to hips not reaching parallel  Mirta: Neg B    Patella Mobility: Min-mod limitation in all directions LLE that improved w/ mobs      Observation: Mild LLE edema in ankle that has improved since last visit. Patient feels ankle pumps have been helping reduce edema.     Gait: flexed trunk         Special Tests  [place] compression: Pos due to pain reproduction over ITB insertion.      Outcome Measures:        Treatments:    There-ex: 15'  Long arc quads 3x12  Sci fit bike level 6.5 seat 13 x10\"    Manual: 15'  STM distal ITB, gentle to patient tolerance  PFJ mobs all directions    Thermoprobe: 15'  Level 5 heat, e-stim intensity 18  Time included for set up and to find appropriate dosage.     EDUCATION: Home exercise program, plan of care, activity modifications, pain management, and injury pathology       Assessment:   Patient tolerated today's session w/ a reported reduction in familiar symptoms following manual treatment. Demonstrates improvements in sensitivity as he was able to tolerate increased pressure during manual compared to previous session. Patient will benefit from ongoing PT services to progress strengthening and stretching activities as tolerated and manual for symptom control to reach established goals and maximize functional mobility.         Plan:  Reassess response to treatment after returns from trip.     Goals: Set and discussed today  Active       Left IT band syndrome       STGs       Start:  04/25/24    Expected End:  05/23/24       1. Patient will demonstrate independence w/ HEP to allow for self-management of symptoms  2. Patient will demonstrate increased strength during hip abduction and " knee extension MMT to 5/5+ pain free to improve ADL performance.   3. Patient will demonstrate improved knee flexion ROM to equal unaffected side + pain free to improve ADLs.   4. Patient will report a decrease in pain by at least 2 points to meet the MCID  5. Patient will tolerate golfing 9 holes w/ a reported decrease in symptoms to progress towards long term goal          LTGs       Start:  04/25/24    Expected End:  06/20/24       1. Patient will demonstrate improved flexibility by having a negative Obers test B to decrease tension on the IT band during functional mobility.   2. Patient will be able to reciprocally negotiate stairs w/o a reproduction of familiar symptoms to improve ADL performance and demonstrate an improved capacity for functional mobility.   3. Patient will report an improved score on LEFS by at least 9 points to meet the MDC  4. Patient will demonstrate improved quadriceps flexibility by having a negative Ely's test to reduce knee tension and improve functional mobility.   5. Patient will tolerate golfing a full round of 18 w/o a return of familiar symptoms to demonstrate a return to PLOF.                   Plan of care was developed with input and agreement by the patient      Chevy Alfonso, PT, ATC

## 2024-06-03 ENCOUNTER — TREATMENT (OUTPATIENT)
Dept: PHYSICAL THERAPY | Facility: CLINIC | Age: 64
End: 2024-06-03
Payer: MEDICARE

## 2024-06-03 DIAGNOSIS — G89.29 CHRONIC PAIN OF LEFT KNEE: ICD-10-CM

## 2024-06-03 DIAGNOSIS — M76.32 IT BAND SYNDROME, LEFT: Primary | ICD-10-CM

## 2024-06-03 DIAGNOSIS — M25.562 CHRONIC PAIN OF LEFT KNEE: ICD-10-CM

## 2024-06-03 PROCEDURE — 97110 THERAPEUTIC EXERCISES: CPT | Mod: GP

## 2024-06-03 PROCEDURE — 97032 APPL MODALITY 1+ESTIM EA 15: CPT | Mod: GP

## 2024-06-03 PROCEDURE — 97140 MANUAL THERAPY 1/> REGIONS: CPT | Mod: GP

## 2024-06-03 ASSESSMENT — PAIN - FUNCTIONAL ASSESSMENT: PAIN_FUNCTIONAL_ASSESSMENT: 0-10

## 2024-06-03 ASSESSMENT — PAIN SCALES - GENERAL: PAINLEVEL_OUTOF10: 0 - NO PAIN

## 2024-06-03 NOTE — PROGRESS NOTES
Physical Therapy  Physical Therapy Treatment    Patient Name: Asif Villarreal  MRN: [id number]  Today's Date: 6/3/2024  Time Calculation  Start Time: 0840  Stop Time: 0925  Time Calculation (min): 45 min    PT Therapeutic Procedures Time Entry  Manual Therapy Time Entry: 15  Therapeutic Exercise Time Entry: 15   PT Modalities Time Entry  E-Stim (Attended) Time Entry: 15        Insurance:  Visit number: 4 of MN  Authorization info: No auth  Insurance Type: Payor: MEDICARE / Plan: MEDICARE PART A AND B / Product Type: *No Product type* /      Current Problem  1. It band syndrome, left  Follow Up In Physical Therapy      2. Chronic pain of left knee  Follow Up In Physical Therapy          Referred by: Dr. Perez    General:  General  Reason for Referral: Left knee pain  Referred By: Dr. Perez    Precautions:  Precautions  Precautions Comment: CRPS history  Medical History Form: Reviewed (scanned into chart)    Subjective:   Subjective   Patient reports he has been doing very well since his lat visit. He is back in town from [place] for a few weeks. While he was out there, he was able to return to full rounds of golf w/o a return of familiar pain. No pain upon entering the clinic today, just mild tightness.     Pain:  Pain Assessment: 0-10  Pain Score: 0 - No pain      Objective:    ROM    Hip AROM (Degrees)      (R)  (L)  Flexion: wfl  wfl   Extension: wfl  wfl     Abduction: wfl  wfl     Adduction: wfl  wfl     ER:  wfl  wfl     IR:  wfl  wfl         Knee AROM (Degrees) updated 6/3      (R)  (L)  Flexion: 125  120      Extension: +2  +2         Knee PROM (Degrees)      (R)  (L)  Flexion: 125  125     Extension: +2  +2         Ankle AROM (Degrees)      (R)  (L)  Plantarflexion: wfl  wfl      Dorsiflexion: wfl  wfl     Inversion: wfl  wfl      Eversion: wfl  wfl            Strength Testing updated 6/3    Hip    (R)  (L)  Flexion: 5/5 5/5      Extension: 5/5 5/5     Abduction: 5/5 5/5  "            Knee    (R)  (L)  Flexion:       Extension:           Functional Screening  Deferred   Squat:   Lunge:   SL Balance:   Lateral Step Down:   SL Quarter Squat:       Posture: kyphotic       Palpation: TTP over ITB insertion only. No longer TTP along proximal aspect towards origin of ITB.    Flexibility: Updated 6/3  Elys: Neg  Eron:   Obers: Pos B due to hips not reaching parallel  Mirta: Neg B    Patella Mobility: Min-mod limitation in all directions LLE that improved w/ mobs      Observation: Mild LLE edema in ankle that has improved since last visit. Patient feels ankle pumps have been helping reduce edema.     Gait: flexed trunk         Special Tests  [place] compression: Pos due to pain reproduction over ITB insertion.      Outcome Measures:  Other Measures  Lower Extremity Funtional Score (LEFS): 66/80     Treatments:    There-ex: 15'  Long arc quads w/ 3# ankle weight and 4\" holds  Sci fit bike seat 12 x10\"    Manual: 15'  STM distal ITB, to patient tolerance    Thermoprobe: 15'  Level 5 heat, e-stim intensity 10  Time included for set up and to find appropriate dosage.     EDUCATION: Home exercise program, plan of care, activity modifications, pain management, and injury pathology       Assessment:   Patient tolerated today's session w/ no issues and a reported reduction in tightness post session. Demonstrates improvements in LE strength, knee ROM reported ADL function compared to the time of his evaluation. Patient has achieved all established and feels comfortable continuing stretching and strengthening on his own. Patient is appropriate for discharge to self care at this time w/ instruction to reach out to the office if he needs anything in the future.      Plan:  Discharge outpatient PT services.    Discharge Summary    Name: Asif Villarreal  MRN: [id number]  : 1960  Date: 24    Discharge Summary: PT    Discharge Information: Date of discharge 6/3/2024, Date of last " visit 6/3/2024, Date of evaluation 4/25/2024, Number of attended visits 4, Referred by Dr. Perez, and Referred for IT band syndrome and chronic L knee pain.    Therapy Summary: Patient attended 4 visits including his initial evaluation from 4/25/2024-6/3/2024. Treatment consisted of manual therapy, therapeutic exercise and attended electrical stimulation. Patient made improvements in knee ROM, strength, hip strength and tolerance to ADLs. He achieved all goals that were established at the time of his evaluation and is instructed to reach out to the office if any new issues occur.     Discharge Status: Discharged to self care w/ HEP and instructions to continue ADLs, strengthening and stretching on his own.      Rehab Discharge Reason: Achieved all and/or the most significant goals(s)        Goals: Set and discussed today  Resolved       Left IT band syndrome       STGs (Met)       Start:  04/25/24    Expected End:  05/23/24    Resolved:  06/03/24    1. Patient will demonstrate independence w/ HEP to allow for self-management of symptoms  2. Patient will demonstrate increased strength during hip abduction and knee extension MMT to 5/5+ pain free to improve ADL performance.   3. Patient will demonstrate improved knee flexion ROM to equal unaffected side + pain free to improve ADLs.   4. Patient will report a decrease in pain by at least 2 points to meet the MCID  5. Patient will tolerate golfing 9 holes w/ a reported decrease in symptoms to progress towards long term goal          LTGs (Met)       Start:  04/25/24    Expected End:  06/20/24    Resolved:  06/03/24    1. Patient will demonstrate improved flexibility by having a negative Obers test B to decrease tension on the IT band during functional mobility.   2. Patient will be able to reciprocally negotiate stairs w/o a reproduction of familiar symptoms to improve ADL performance and demonstrate an improved capacity for functional mobility.   3. Patient will report  an improved score on LEFS by at least 9 points to meet the MDC  4. Patient will demonstrate improved quadriceps flexibility by having a negative Ely's test to reduce knee tension and improve functional mobility.   5. Patient will tolerate golfing a full round of 18 w/o a return of familiar symptoms to demonstrate a return to PLOF.                   Plan of care was developed with input and agreement by the patient      Chevy Alfonso PT

## 2024-08-23 ENCOUNTER — TREATMENT (OUTPATIENT)
Dept: PHYSICAL THERAPY | Facility: CLINIC | Age: 64
End: 2024-08-23
Payer: MEDICARE

## 2024-08-23 DIAGNOSIS — M76.32 IT BAND SYNDROME, LEFT: ICD-10-CM

## 2024-08-23 DIAGNOSIS — M25.562 CHRONIC PAIN OF LEFT KNEE: ICD-10-CM

## 2024-08-23 DIAGNOSIS — G89.29 CHRONIC PAIN OF LEFT KNEE: ICD-10-CM

## 2024-08-23 PROCEDURE — 97110 THERAPEUTIC EXERCISES: CPT | Mod: GP

## 2024-08-23 PROCEDURE — 97140 MANUAL THERAPY 1/> REGIONS: CPT | Mod: GP

## 2024-08-23 ASSESSMENT — PAIN SCALES - GENERAL: PAINLEVEL_OUTOF10: 0 - NO PAIN

## 2024-08-23 ASSESSMENT — PAIN - FUNCTIONAL ASSESSMENT: PAIN_FUNCTIONAL_ASSESSMENT: 0-10

## 2024-09-05 ENCOUNTER — TREATMENT (OUTPATIENT)
Dept: PHYSICAL THERAPY | Facility: CLINIC | Age: 64
End: 2024-09-05
Payer: MEDICARE

## 2024-09-05 DIAGNOSIS — G89.29 CHRONIC PAIN OF LEFT KNEE: Primary | ICD-10-CM

## 2024-09-05 DIAGNOSIS — M25.562 CHRONIC PAIN OF LEFT KNEE: Primary | ICD-10-CM

## 2024-09-05 DIAGNOSIS — M76.32 IT BAND SYNDROME, LEFT: ICD-10-CM

## 2024-09-05 PROCEDURE — 97110 THERAPEUTIC EXERCISES: CPT | Mod: GP

## 2024-09-05 PROCEDURE — 97140 MANUAL THERAPY 1/> REGIONS: CPT | Mod: GP

## 2024-09-05 ASSESSMENT — PAIN - FUNCTIONAL ASSESSMENT: PAIN_FUNCTIONAL_ASSESSMENT: 0-10

## 2024-09-05 ASSESSMENT — PAIN SCALES - GENERAL: PAINLEVEL_OUTOF10: 4

## 2024-09-05 NOTE — PROGRESS NOTES
Physical Therapy  Physical Therapy Treatment    Patient Name: Asif Villarreal  MRN: [id number]  Today's Date: 9/5/2024  Time Calculation  Start Time: 1317  Stop Time: 1402  Time Calculation (min): 45 min    PT Therapeutic Procedures Time Entry  Manual Therapy Time Entry: 25  Therapeutic Exercise Time Entry: 20          Insurance:  Visit number: 6 of [place]  Authorization info: No auth  Insurance Type: Payor: MEDICARE / Plan: MEDICARE PART A AND B / Product Type: *No Product type* /      Current Problem  1. Chronic pain of left knee  Follow Up In Physical Therapy      2. It band syndrome, left  Follow Up In Physical Therapy              Referred by: Dr. Perez    General:  General  Reason for Referral: Left knee pain  Referred By: Dr. Perez    Precautions:  Precautions  Precautions Comment: CRPS history  Medical History Form: Reviewed (scanned into chart)    Subjective:   Patient reports he is having a rough day today. Had a very active day yesterday, worked out very hard and golfed 18 holes in a scramble. Feels he played well, knee was able to tolerate everything but at night he began to experience increased pain. Feels his pain has been stiffness lately.     Pain:  Pain Assessment: 0-10  0-10 (Numeric) Pain Score: 4 reduced to 0 post session      Objective:    ROM    Hip AROM (Degrees)      (R)  (L)  Flexion: wfl  wfl   Extension: wfl  wfl     Abduction: wfl  wfl     Adduction: wfl  wfl     ER:  wfl  wfl     IR:  wfl  wfl         Knee AROM (Degrees) updated 9/5      (R)  (L)  Flexion: 125  115      Extension: +2  +2         Knee PROM (Degrees) updated 9/5      (R)  (L)  Flexion: 125  120*      Extension: +2  +2     *guarding/empty end feel    Ankle AROM (Degrees)      (R)  (L)  Plantarflexion: wfl  wfl      Dorsiflexion: wfl  wfl     Inversion: wfl  wfl      Eversion: wfl  wfl            Strength Testing    Hip    (R)  (L)  Flexion: 5/5 5/5      Extension: 5/5 5/5     Abduction: 5/5 4/5 +  "pain            Knee    (R)  (L)  Flexion: 5/5  5/5      Extension: 5/5  5/5 + pain           Functional Screening  Deferred   Squat:   Lunge:   SL Balance:   Lateral Step Down:   SL Quarter Squat:       Posture: kyphotic       Palpation: TTP over ITB insertion, lateral quad soft tissue and quad tendon.     Flexibility:   Elys: Pos L due to hips lifting off table  Eron:   Obers: Pos B due to hips not reaching parallel  Mirta: Neg B    Patella Mobility: Min-mod limitation in all directions LLE that improved w/ mobs      Observation: Mild LLE edema in ankle that has improved since last visit. Patient feels ankle pumps have been helping reduce edema.     Gait: flexed trunk         Special Tests  Estelle compression: Pos due to pain reproduction over ITB insertion.      Outcome Measures:        Treatments:    There-ex: 20'  Lateral step ups to 6\" w/ 31# 4x8*  Side stepping w/ black TB 4x1'*  Hip adductor slides 3x12*    * = added to HEP.  Sheet with exercise descriptions and images issued.  Skilled intervention utilized in the appropriate selection & application of above exercises.  Verbal and tactile cues provided for proper form and technique.  Pt. demonstrated appropriate form & verbalized understanding of optimal technique for above exercises.    [facility].[place]    Access Code: [email or web address]    Manual: 25'  IASTM quads  IASTM ITB  Supine knee flexion gapping mob over forearm to patient tolerance      EDUCATION: Home exercise program, plan of care, activity modifications, pain management, and injury pathology       Assessment:   Patient tolerated today's session w/ a reported reduction in familiar pain and stiffness at the conclusion of session. Patient felt challenged w/ exercises in today's session and found them to be beneficial as he reported feeling them stretch and work muscles he hasn't exercises in awhile. Demonstrates improvements in activity tolerance through self reporting of golfing 18 " holes and through completion of full session's worth of there-ex w/o a return of familiar pain/symptoms. Patient will benefit from continue PT to progress LE strengthening to reach established goals and maximize functional mobility.         Plan:  Continue hip lateral rotator strengthening. Trial of fire hydrants, modified side planks and fwd step downs. Recheck strength and flexibility.    Goals: Set and discussed today  Active       Chronic L knee pain       LTGs       Start:  08/23/24    Expected End:  10/18/24       1. Patient will report a decrease in stiffness w/ stair negotiation by at least 2 points to meet the MDC and improve performance   2. Patient will be able to reciprocally negotiate stairs w/o a reproduction of familiar symptoms to improve ADL performance   3. Patient will report an improved score on LEFS by at least 9 points to meet the MDC  4. Patient will demonstrate improved strength during MMT hip abduction to 5/5 + pain free to improve ADLs.  5. Patient will tolerate resisted knee extension MMT w/o a production of familiar knee pain to improve ADLs.            Resolved       Left IT band syndrome       STGs (Met)       Start:  04/25/24    Expected End:  05/23/24    Resolved:  06/03/24    1. Patient will demonstrate independence w/ HEP to allow for self-management of symptoms  2. Patient will demonstrate increased strength during hip abduction and knee extension MMT to 5/5+ pain free to improve ADL performance.   3. Patient will demonstrate improved knee flexion ROM to equal unaffected side + pain free to improve ADLs.   4. Patient will report a decrease in pain by at least 2 points to meet the MCID  5. Patient will tolerate golfing 9 holes w/ a reported decrease in symptoms to progress towards long term goal          LTGs (Met)       Start:  04/25/24    Expected End:  06/20/24    Resolved:  06/03/24    1. Patient will demonstrate improved flexibility by having a negative Obers test B to decrease  tension on the IT band during functional mobility.   2. Patient will be able to reciprocally negotiate stairs w/o a reproduction of familiar symptoms to improve ADL performance and demonstrate an improved capacity for functional mobility.   3. Patient will report an improved score on LEFS by at least 9 points to meet the MDC  4. Patient will demonstrate improved quadriceps flexibility by having a negative Ely's test to reduce knee tension and improve functional mobility.   5. Patient will tolerate golfing a full round of 18 w/o a return of familiar symptoms to demonstrate a return to PLOF.                     Plan of care was developed with input and agreement by the patient      Chevy Alfonso, PT, ATC

## 2024-09-09 ENCOUNTER — TREATMENT (OUTPATIENT)
Dept: PHYSICAL THERAPY | Facility: CLINIC | Age: 64
End: 2024-09-09
Payer: MEDICARE

## 2024-09-09 DIAGNOSIS — G89.29 CHRONIC PAIN OF LEFT KNEE: Primary | ICD-10-CM

## 2024-09-09 DIAGNOSIS — M25.562 CHRONIC PAIN OF LEFT KNEE: Primary | ICD-10-CM

## 2024-09-09 DIAGNOSIS — M76.32 IT BAND SYNDROME, LEFT: ICD-10-CM

## 2024-09-09 PROCEDURE — 97140 MANUAL THERAPY 1/> REGIONS: CPT | Mod: GP

## 2024-09-09 PROCEDURE — 97110 THERAPEUTIC EXERCISES: CPT | Mod: GP

## 2024-09-09 ASSESSMENT — PAIN - FUNCTIONAL ASSESSMENT: PAIN_FUNCTIONAL_ASSESSMENT: 0-10

## 2024-09-09 ASSESSMENT — PAIN SCALES - GENERAL: PAINLEVEL_OUTOF10: 0 - NO PAIN

## 2024-09-09 NOTE — PROGRESS NOTES
"  Physical Therapy  Physical Therapy Treatment    Patient Name: Asif Villarreal  MRN: [id number]  Today's Date: 9/9/2024                  Insurance:  Visit number: 7 of [place]  Authorization info: No auth  Insurance Type: Payor: MEDICARE / Plan: MEDICARE PART A AND B / Product Type: *No Product type* /      Current Problem  1. Chronic pain of left knee  Follow Up In Physical Therapy      2. It band syndrome, left  Follow Up In Physical Therapy            Referred by: Dr. Perez    General:  General  Reason for Referral: Left knee pain  Referred By: Dr. Perez    Precautions:  Precautions  Precautions Comment: CRPS history  Medical History Form: Reviewed (scanned into chart)    Subjective:   Patient reports he is feeling ok today. Was very sore after golfing on Friday, today he feels \"88\" years old today due to how stiff he is feeling. Hasn't done HEP the past few days due to how stiff he is but he did stretch this morning.     Pain:  Pain Assessment: 0-10  0-10 (Numeric) Pain Score: 0 - No pain (no pain just stiffness)       Objective:    ROM    Hip AROM (Degrees)      (R)  (L)  Flexion: wfl  wfl   Extension: wfl  wfl     Abduction: wfl  wfl     Adduction: wfl  wfl     ER:  wfl  wfl     IR:  wfl  wfl         Knee AROM (Degrees) updated 9/5      (R)  (L)  Flexion: 125  115      Extension: +2  +2         Knee PROM (Degrees) updated 9/5      (R)  (L)  Flexion: 125  120*      Extension: +2  +2     *guarding/empty end feel    Ankle AROM (Degrees)      (R)  (L)  Plantarflexion: wfl  wfl      Dorsiflexion: wfl  wfl     Inversion: wfl  wfl      Eversion: wfl  wfl            Strength Testing updated 9/9    Hip    (R)  (L)  Flexion: 5/5  5/5      Extension: 5/5  5/5     Abduction: 5/5 5/5            Knee    (R)  (L)  Flexion: 5/5  5/5      Extension: 5/5  5/5            Functional Screening  Deferred   Squat:   Lunge:   SL Balance:   Lateral Step Down:   SL Quarter Squat:       Posture: kyphotic       Palpation: Not TTP    Flexibility: " "updated 9/9  Elys: Pos L due to hips lifting off table  Eron:   Obers: neg B  Mirta: Neg B    Patella Mobility: Min-mod limitation in all directions LLE that improved w/ mobs      Observation: Mild LLE edema in ankle that has improved since last visit. Patient feels ankle pumps have been helping reduce edema.     Gait: flexed trunk         Special Tests  Estelle compression: Pos due to pain reproduction over ITB insertion.      Outcome Measures:        Treatments:    There-ex: 30'  Supine TFL stretch w/ strap 3x20\"  Prone quad stretch w/ strap 3x20\"  Side-lying clamshells w/ 5-6\" holds and black TB 4x5  LAQs w/ 5# and 5-6\" holds 3x10      * = added to HEP.  Sheet with exercise descriptions and images issued.  Skilled intervention utilized in the appropriate selection & application of above exercises.  Verbal and tactile cues provided for proper form and technique.  Pt. demonstrated appropriate form & verbalized understanding of optimal technique for above exercises.    [facility].[employer]    Access Code: [email or web address]    Manual: 25'  IASTM quads, adductors  IASTM ITB    EDUCATION: Home exercise program, plan of care, activity modifications, pain management, and injury pathology       Assessment:   Patient tolerated today's session w/ *** demonstrates improvements in *** and difficulty w/ ***. Patient will benefit from ongoing PT services to progress *** and reach established goals to ***           Plan:  Continue hip lateral rotator strengthening. Trial of fire hydrants, modified side planks and fwd step downs. Recheck strength and flexibility.    Goals: Set and discussed today  Active       Chronic L knee pain       LTGs       Start:  08/23/24    Expected End:  10/18/24       1. Patient will report a decrease in stiffness w/ stair negotiation by at least 2 points to meet the MDC and improve performance   2. Patient will be able to reciprocally negotiate stairs w/o a reproduction of familiar symptoms to " improve ADL performance   3. Patient will report an improved score on LEFS by at least 9 points to meet the MDC  4. Patient will demonstrate improved strength during MMT hip abduction to 5/5 + pain free to improve ADLs.  5. Patient will tolerate resisted knee extension MMT w/o a production of familiar knee pain to improve ADLs.            Resolved       Left IT band syndrome       STGs (Met)       Start:  04/25/24    Expected End:  05/23/24    Resolved:  06/03/24    1. Patient will demonstrate independence w/ HEP to allow for self-management of symptoms  2. Patient will demonstrate increased strength during hip abduction and knee extension MMT to 5/5+ pain free to improve ADL performance.   3. Patient will demonstrate improved knee flexion ROM to equal unaffected side + pain free to improve ADLs.   4. Patient will report a decrease in pain by at least 2 points to meet the MCID  5. Patient will tolerate golfing 9 holes w/ a reported decrease in symptoms to progress towards long term goal          LTGs (Met)       Start:  04/25/24    Expected End:  06/20/24    Resolved:  06/03/24    1. Patient will demonstrate improved flexibility by having a negative Obers test B to decrease tension on the IT band during functional mobility.   2. Patient will be able to reciprocally negotiate stairs w/o a reproduction of familiar symptoms to improve ADL performance and demonstrate an improved capacity for functional mobility.   3. Patient will report an improved score on LEFS by at least 9 points to meet the MDC  4. Patient will demonstrate improved quadriceps flexibility by having a negative Ely's test to reduce knee tension and improve functional mobility.   5. Patient will tolerate golfing a full round of 18 w/o a return of familiar symptoms to demonstrate a return to PLOF.                     Plan of care was developed with input and agreement by the patient      Chevy Alfonso, PT, ATC

## 2024-09-09 NOTE — PROGRESS NOTES
"  Physical Therapy  Physical Therapy Treatment    Patient Name: Asif Villarreal  MRN: [id number]  Today's Date: 9/9/2024  Time Calculation  Start Time: 1340  Stop Time: 1435  Time Calculation (min): 55 min    PT Therapeutic Procedures Time Entry  Manual Therapy Time Entry: 25  Therapeutic Exercise Time Entry: 30          Insurance:  Visit number: 7 of [place]  Authorization info: No auth  Insurance Type: Payor: MEDICARE / Plan: MEDICARE PART A AND B / Product Type: *No Product type* /      Current Problem  1. Chronic pain of left knee  Follow Up In Physical Therapy      2. It band syndrome, left  Follow Up In Physical Therapy            Referred by: Dr. Perez    General:  General  Reason for Referral: Left knee pain  Referred By: Dr. Perez    Precautions:  Precautions  Precautions Comment: CRPS history  Medical History Form: Reviewed (scanned into chart)    Subjective:   Patient reports he is feeling ok today. Was very sore after golfing on Friday, today he feels \"88\" years old today due to how stiff he is feeling. Hasn't done HEP the past few days due to how stiff he is but he did stretch this morning.     Pain:  Pain Assessment: 0-10  0-10 (Numeric) Pain Score: 0 - No pain (no pain just stiffness)       Objective:    ROM    Hip AROM (Degrees)      (R)  (L)  Flexion: wfl  wfl   Extension: wfl  wfl     Abduction: wfl  wfl     Adduction: wfl  wfl     ER:  wfl  wfl     IR:  wfl  wfl         Knee AROM (Degrees) updated 9/5      (R)  (L)  Flexion: 125  115      Extension: +2  +2         Knee PROM (Degrees) updated 9/5      (R)  (L)  Flexion: 125  120*      Extension: +2  +2     *guarding/empty end feel    Ankle AROM (Degrees)      (R)  (L)  Plantarflexion: wfl  wfl      Dorsiflexion: wfl  wfl     Inversion: wfl  wfl      Eversion: wfl  wfl            Strength Testing updated " "9/9    Hip    (R)  (L)  Flexion: 5/5 5/5      Extension: 5/5 5/5     Abduction: 5/5 5/5            Knee    (R)  (L)  Flexion: 5/5 5/5      Extension: 5/5 5/5            Functional Screening  Deferred   Squat:   Lunge:   SL Balance:   Lateral Step Down:   SL Quarter Squat:       Posture: kyphotic       Palpation: Not TTP    Flexibility: updated 9/9  Elys: Pos L due to hips lifting off table  Eron:   Obers: neg B  Mirta: Neg B    Patella Mobility: Min-mod limitation in all directions LLE that improved w/ mobs      Observation: Mild LLE edema in ankle that has improved since last visit. Patient feels ankle pumps have been helping reduce edema.     Gait: flexed trunk         Special Tests  Estelle compression: Pos due to pain reproduction over ITB insertion.      Outcome Measures:        Treatments:    There-ex: 30'  Supine TFL stretch w/ strap 3x20\"  Prone quad stretch w/ strap 3x20\"  Side-lying clamshells w/ 5-6\" holds and black TB 4x5  LAQs w/ 5# and 5-6\" holds 3x10  Long PNE discussion, patient appeared to respond well to pain neuroscience education.     * = added to HEP.  Sheet with exercise descriptions and images issued.  Skilled intervention utilized in the appropriate selection & application of above exercises.  Verbal and tactile cues provided for proper form and technique.  Pt. demonstrated appropriate form & verbalized understanding of optimal technique for above exercises.    [facility].[employer]    Access Code: [email or web address]    Manual: 25'  IASTM quads, adductors  IASTM ITB    EDUCATION: Home exercise program, plan of care, activity modifications, pain management, and injury pathology       Assessment:   Patient tolerated today's session w/ expected muscle fatigue. Demonstrates improvements in activity tolerance by completing hip and knee MMT + palpation w/o a production of familiar lateral knee pain. Patient had difficulty w/ clamshell and LAQ isometric holds. He became fatigued with " around 5-6 repetitions of each exercise. Patient is progressing and will benefit from ongoing PT services to continue LE endurance and flexibility activities, as well as continued manual therapy for desensitization as tolerated to reach established goals to maximize functional mobility.       Plan:  Continue hip lateral rotator strengthening. Trial of fire hydrants, modified side planks and fwd step downs.     Goals: Set and discussed today  Active       Chronic L knee pain       LTGs (Progressing)       Start:  08/23/24    Expected End:  10/18/24       1. Patient will report a decrease in stiffness w/ stair negotiation by at least 2 points to meet the MDC and improve performance   2. Patient will be able to reciprocally negotiate stairs w/o a reproduction of familiar symptoms to improve ADL performance   3. Patient will report an improved score on LEFS by at least 9 points to meet the MDC  4. Patient will demonstrate improved strength during MMT hip abduction to 5/5 + pain free to improve ADLs.  5. Patient will tolerate resisted knee extension MMT w/o a production of familiar knee pain to improve ADLs.            Resolved       Left IT band syndrome       STGs (Met)       Start:  04/25/24    Expected End:  05/23/24    Resolved:  06/03/24    1. Patient will demonstrate independence w/ HEP to allow for self-management of symptoms  2. Patient will demonstrate increased strength during hip abduction and knee extension MMT to 5/5+ pain free to improve ADL performance.   3. Patient will demonstrate improved knee flexion ROM to equal unaffected side + pain free to improve ADLs.   4. Patient will report a decrease in pain by at least 2 points to meet the MCID  5. Patient will tolerate golfing 9 holes w/ a reported decrease in symptoms to progress towards long term goal          LTGs (Met)       Start:  04/25/24    Expected End:  06/20/24    Resolved:  06/03/24    1. Patient will demonstrate improved flexibility by having a  negative Obers test B to decrease tension on the IT band during functional mobility.   2. Patient will be able to reciprocally negotiate stairs w/o a reproduction of familiar symptoms to improve ADL performance and demonstrate an improved capacity for functional mobility.   3. Patient will report an improved score on LEFS by at least 9 points to meet the MDC  4. Patient will demonstrate improved quadriceps flexibility by having a negative Ely's test to reduce knee tension and improve functional mobility.   5. Patient will tolerate golfing a full round of 18 w/o a return of familiar symptoms to demonstrate a return to PLOF.                     Plan of care was developed with input and agreement by the patient      Chevy Alfonso, PT, ATC

## 2024-09-12 NOTE — PROGRESS NOTES
Physical Therapy  Physical Therapy Treatment    Patient Name: Asif Villarreal  MRN: [id number]  Today's Date: 8/23/2024  Time Calculation  Start Time: 0845  Stop Time: 0930  Time Calculation (min): 45 min    PT Therapeutic Procedures Time Entry  Manual Therapy Time Entry: 25  Therapeutic Exercise Time Entry: 15     Non-Billable Time  Non-billable time: 5  Non-billable time reason: Trial of dry needling    Insurance:  Visit number: 5 of [place]  Authorization info: No auth  Insurance Type: Payor: MEDICARE / Plan: MEDICARE PART A AND B / Product Type: *No Product type* /      Current Problem  1. Chronic pain of left knee  Follow Up In Physical Therapy      2. It band syndrome, left  Follow Up In Physical Therapy            Referred by: Dr. Perez    General:  General  Reason for Referral: Left knee pain  Referred By: Dr. Perez    Precautions:  Precautions  Precautions Comment: CRPS history  Medical History Form: Reviewed (scanned into chart)    Subjective:   Patient reports he felt great for about 2-3 weeks after his last PT visit. After that the stiffness returned and has been constant since. Expresses a lot of frustration w/ this issue still affecting his day to day life. Has not been experiencing pain, only stiffness.     Pain:  Pain Assessment: 0-10  0-10 (Numeric) Pain Score: 0 - No pain      Objective:    ROM    Hip AROM (Degrees)      (R)  (L)  Flexion: wfl  wfl   Extension: wfl  wfl     Abduction: wfl  wfl     Adduction: wfl  wfl     ER:  wfl  wfl     IR:  wfl  wfl         Knee AROM (Degrees)      (R)  (L)  Flexion: 125  115      Extension: +2  +2         Knee PROM (Degrees)      (R)  (L)  Flexion: 125  115*      Extension: +2  +2     *guarding/empty end feel    Ankle AROM (Degrees)      (R)  (L)  Plantarflexion: wfl  wfl      Dorsiflexion: wfl  wfl     Inversion: wfl  wfl      Eversion: wfl  wfl            Strength Testing    Hip    (R)  (L)  Flexion: 5/5 5/5      Extension: 5/5  5/5     Abduction: 5/5 4/5 +  Successful outreach to patient regarding hospitalization as patient continues TCM program.   At time of outreach call the patient feels as if their condition has improved since initial visit with PCP or specialist.  Questions or concerns addressed at this time with patient.   Provided contact information to patient if any further non-emergent needs arise.     "pain            Knee    (R)  (L)  Flexion: 5/5  5/5      Extension: 5/5  5/5 + pain           Functional Screening  Deferred   Squat:   Lunge:   SL Balance:   Lateral Step Down:   SL Quarter Squat:       Posture: kyphotic       Palpation: TTP over ITB insertion only. No longer TTP along proximal aspect towards origin of ITB.    Flexibility:   Elys: Pos L due to hips lifting off table  Eron:   Obers: Pos B due to hips not reaching parallel  Mirta: Neg B    Patella Mobility: Min-mod limitation in all directions LLE that improved w/ mobs      Observation: Mild LLE edema in ankle that has improved since last visit. Patient feels ankle pumps have been helping reduce edema.     Gait: flexed trunk         Special Tests  Estelle compression: Pos due to pain reproduction over ITB insertion.      Outcome Measures:        Treatments:    There-ex: 15'  Hip abduction isometric wall ball 3x10 w/ 5-6\" holds*  Standing fire hydrants w/ foot supported on wall and HHS prn 2x10  Seated hip IR w/ adductor ball squeeze 3x10*  Reverse clams w/ air-ex pad between knee 3x12*    * = added to HEP.  Sheet with exercise descriptions and images issued.  Skilled intervention utilized in the appropriate selection & application of above exercises.  Verbal and tactile cues provided for proper form and technique.  Pt. demonstrated appropriate form & verbalized understanding of optimal technique for above exercises.      Manual: 25'  STM, DTM to distal ITB, to patient tolerance  Trp release and effleurage to quads and hip abductors and ITB    Verbal and written education provided to patient this date regarding TrP dry needling and associated risks and benefits. Patient verbalizes understanding of associated risks and benefits with TrP dry needling, provides verbal consent to proceed, and denies questions at this time. Dry needling utilized this date to address ongoing pain and dysfunction in L ITB. Soft tissue assessment completed via manual " palpation with active TrPs, muscular hypertonicity, and pain noted throughout.     Treatment: 30x50 mm needle inserted into L distal ITB x 2 and left in situ/performed with pistoning/performed with needle rotation to patient tolerance.   30x50 mm needle inserted into L ITB homeostatic point x1 and left in situ/performed with pistoning/performed with needle rotation to patient tolerance.   Not billed x5'    Needle site clear and without adverse reaction immediately post needle removal. Patient reports reduction in pain and tightness immediately post treatment with improvements in muscular tone, flexibility, and ROM noted as well. Patient advised to call PT if excessive soreness, bruising, or adverse event is noted post needling. Patient verbalizes understanding and denies questions at this time.         EDUCATION: Home exercise program, plan of care, activity modifications, pain management, and injury pathology       Assessment:   Patient tolerated today's session w/ expected muscle fatigue. He continues to struggle w/ chronic stiffness and distal IT band pain. Appears to continue to struggle w/ chronic pain and tightness due to lateral hip weakness and increased sensitivity to touch. Future visits will focus on strengthening as opposed to mobility work to see how he responds.        Plan:  Assess response to manual and progression of there-ex. Continue hip lateral rotator strengthening progression as tolerated. Side stepping, lateral step downs. Continue manual if warranted.     Goals: Set and discussed today  Active       Chronic L knee pain       LTGs       Start:  08/23/24    Expected End:  10/18/24       1. Patient will report a decrease in stiffness w/ stair negotiation by at least 2 points to meet the MDC and improve performance   2. Patient will be able to reciprocally negotiate stairs w/o a reproduction of familiar symptoms to improve ADL performance   3. Patient will report an improved score on LEFS by at  least 9 points to meet the MDC  4. Patient will demonstrate improved strength during MMT hip abduction to 5/5 + pain free to improve ADLs.  5. Patient will tolerate resisted knee extension MMT w/o a production of familiar knee pain to improve ADLs.            Resolved       Left IT band syndrome       STGs (Met)       Start:  04/25/24    Expected End:  05/23/24    Resolved:  06/03/24    1. Patient will demonstrate independence w/ HEP to allow for self-management of symptoms  2. Patient will demonstrate increased strength during hip abduction and knee extension MMT to 5/5+ pain free to improve ADL performance.   3. Patient will demonstrate improved knee flexion ROM to equal unaffected side + pain free to improve ADLs.   4. Patient will report a decrease in pain by at least 2 points to meet the MCID  5. Patient will tolerate golfing 9 holes w/ a reported decrease in symptoms to progress towards long term goal          LTGs (Met)       Start:  04/25/24    Expected End:  06/20/24    Resolved:  06/03/24    1. Patient will demonstrate improved flexibility by having a negative Obers test B to decrease tension on the IT band during functional mobility.   2. Patient will be able to reciprocally negotiate stairs w/o a reproduction of familiar symptoms to improve ADL performance and demonstrate an improved capacity for functional mobility.   3. Patient will report an improved score on LEFS by at least 9 points to meet the MDC  4. Patient will demonstrate improved quadriceps flexibility by having a negative Ely's test to reduce knee tension and improve functional mobility.   5. Patient will tolerate golfing a full round of 18 w/o a return of familiar symptoms to demonstrate a return to PLOF.                     Plan of care was developed with input and agreement by the patient      Chevy Alfonso, PT, ATC

## 2024-09-24 ENCOUNTER — TREATMENT (OUTPATIENT)
Dept: PHYSICAL THERAPY | Facility: CLINIC | Age: 64
End: 2024-09-24
Payer: MEDICARE

## 2024-09-24 DIAGNOSIS — M76.32 IT BAND SYNDROME, LEFT: ICD-10-CM

## 2024-09-24 DIAGNOSIS — M25.562 CHRONIC PAIN OF LEFT KNEE: ICD-10-CM

## 2024-09-24 DIAGNOSIS — G89.29 CHRONIC PAIN OF LEFT KNEE: ICD-10-CM

## 2024-09-24 PROCEDURE — 97110 THERAPEUTIC EXERCISES: CPT | Mod: GP

## 2024-09-24 PROCEDURE — 97140 MANUAL THERAPY 1/> REGIONS: CPT | Mod: GP

## 2024-09-24 ASSESSMENT — PAIN SCALES - GENERAL: PAINLEVEL_OUTOF10: 5 - MODERATE PAIN

## 2024-09-24 ASSESSMENT — PAIN - FUNCTIONAL ASSESSMENT: PAIN_FUNCTIONAL_ASSESSMENT: 0-10

## 2024-09-24 NOTE — PROGRESS NOTES
Physical Therapy  Physical Therapy Treatment    Patient Name: Asif Villarreal  MRN: [id number]  Today's Date: 9/24/2024  Time Calculation  Start Time: 1002  Stop Time: 1058  Time Calculation (min): 56 min    PT Therapeutic Procedures Time Entry  Manual Therapy Time Entry: 30  Therapeutic Exercise Time Entry: 26          Insurance:  Visit number: 8 of [place]  Authorization info: No auth  Insurance Type: Payor: MEDICARE / Plan: MEDICARE PART A AND B / Product Type: *No Product type* /      Current Problem  1. Chronic pain of left knee  Follow Up In Physical Therapy      2. It band syndrome, left  Follow Up In Physical Therapy            Referred by: Dr. Perez    General:  General  Reason for Referral: Left knee pain  Referred By: Dr. Perez    Precautions:  Precautions  Precautions Comment: CRPS history  Medical History Form: Reviewed (scanned into chart)    Subjective:   Patient reports he is feeling very stiff and sore today. Has been experiencing a lot of stiffness the past few days, especially w/ the rain today. Has not kept up w/ exercises and is feeling very frustrated w/ chronic symptoms. Feels better after PT for 4-5 days each visit. Inquired about contacting MD for Cortizone shots as those have provided him relief in the past. Would like to do after he returns from [place] in November. Leaves 10/19.     Pain:  Pain Assessment: 0-10  0-10 (Numeric) Pain Score: 5 - Moderate pain reduced stiffness post session, improved gait.       Objective:    ROM    Hip AROM (Degrees)      (R)  (L)  Flexion: wfl  wfl   Extension: wfl  wfl     Abduction: wfl  wfl     Adduction: wfl  wfl     ER:  wfl  wfl     IR:  wfl  wfl         Knee AROM (Degrees) updated 9/5      (R)  (L)  Flexion: 125  115      Extension: +2  +2         Knee PROM (Degrees) updated 9/5      (R)  (L)  Flexion: 125  120*      Extension: +2  +2     *guarding/empty end feel    Ankle AROM  "(Degrees)      (R)  (L)  Plantarflexion: wfl  wfl      Dorsiflexion: wfl  wfl     Inversion: wfl  wfl      Eversion: wfl  wfl            Strength Testing updated 9/24    Hip    (R)  (L)  Flexion: 5/5  5/5      Extension: 5/5  5/5     Abduction: 5/5 5/5            Knee    (R)  (L)  Flexion: 5/5  5/5      Extension: 5/5 5/5            Functional Screening  Deferred   Squat:   Lunge:   SL Balance:   Lateral Step Down:   SL Quarter Squat:       Posture: kyphotic       Palpation updated 9/24: TTP w/ multiple trigger points present in lateral quads, ITB    Flexibility: updated 9/9  Elys: Pos L due to hips lifting off table  Eron:   Obers: neg B  Mirta: Neg B    Patella Mobility: Min-mod limitation in all directions LLE that improved w/ mobs      Observation: Mild LLE edema in ankle that has improved since last visit. Patient feels ankle pumps have been helping reduce edema.     Gait: Antalgic, decreased stance time LLE, mild trendelenburg. Improved post session.         Special Tests  [place] compression: Pos due to pain reproduction over ITB insertion.      Outcome Measures:        Treatments:    There-ex: 26'  Recumbent bike x5'  Eron stretch off EOB, discontinued due to pain production   Knee extension isometrics w/ belt and 10\" holds 2x6  Shuttle leg press w/ 100# x20, w/ 125# 2x20  Heel raises w/ 20# KB 3x20*    * = added to HEP.  Sheet with exercise descriptions and images issued.  Skilled intervention utilized in the appropriate selection & application of above exercises.  Verbal and tactile cues provided for proper form and technique.  Pt. demonstrated appropriate form & verbalized understanding of optimal technique for above exercises.    [facility].[employer]    Access Code: [email or web address]    Manual: 30'  STM quads, adductors  STM ITB  Supine knee flexion gapping mob over forearm to tolerance    EDUCATION: Home exercise program, plan of care, activity modifications, pain management, and injury " pathology       Assessment:   Patient tolerated today's session w/ a reported reduction in familiar stiffness and pain. Today's session was focused on decreasing patient's increase in familiar symptoms. He continues to struggle w/ chronic pain and stiffness that will improve temporarily w/ participation in PT. Emphasized importance of daily mobility/stretching w/ patient. Patient required increased time to complete activities due to pain and stiffness today. He will benefit from ongoing PT services to progress LE strength and mobility activities as tolerated to reach established goals to maximize functional mobility.          Plan:  Reassess response to last session, continue hip lateral rotator strengthening. Trial of fire hydrants, modified side planks and fwd step downs.     Goals: Set and discussed today  Active       Chronic L knee pain       LTGs (Progressing)       Start:  08/23/24    Expected End:  10/18/24       1. Patient will report a decrease in stiffness w/ stair negotiation by at least 2 points to meet the MDC and improve performance   2. Patient will be able to reciprocally negotiate stairs w/o a reproduction of familiar symptoms to improve ADL performance   3. Patient will report an improved score on LEFS by at least 9 points to meet the MDC  4. Patient will demonstrate improved strength during MMT hip abduction to 5/5 + pain free to improve ADLs.  5. Patient will tolerate resisted knee extension MMT w/o a production of familiar knee pain to improve ADLs.            Resolved       Left IT band syndrome       STGs (Met)       Start:  04/25/24    Expected End:  05/23/24    Resolved:  06/03/24    1. Patient will demonstrate independence w/ HEP to allow for self-management of symptoms  2. Patient will demonstrate increased strength during hip abduction and knee extension MMT to 5/5+ pain free to improve ADL performance.   3. Patient will demonstrate improved knee flexion ROM to equal unaffected side +  pain free to improve ADLs.   4. Patient will report a decrease in pain by at least 2 points to meet the MCID  5. Patient will tolerate golfing 9 holes w/ a reported decrease in symptoms to progress towards long term goal          LTGs (Met)       Start:  04/25/24    Expected End:  06/20/24    Resolved:  06/03/24    1. Patient will demonstrate improved flexibility by having a negative Obers test B to decrease tension on the IT band during functional mobility.   2. Patient will be able to reciprocally negotiate stairs w/o a reproduction of familiar symptoms to improve ADL performance and demonstrate an improved capacity for functional mobility.   3. Patient will report an improved score on LEFS by at least 9 points to meet the MDC  4. Patient will demonstrate improved quadriceps flexibility by having a negative Ely's test to reduce knee tension and improve functional mobility.   5. Patient will tolerate golfing a full round of 18 w/o a return of familiar symptoms to demonstrate a return to PLOF.                     Plan of care was developed with input and agreement by the patient      Chevy Alfonso, PT, ATC

## 2024-12-17 ENCOUNTER — HOSPITAL ENCOUNTER (OUTPATIENT)
Dept: RADIOLOGY | Facility: EXTERNAL LOCATION | Age: 64
Discharge: HOME | End: 2024-12-17

## 2024-12-17 ENCOUNTER — LAB (OUTPATIENT)
Dept: LAB | Facility: LAB | Age: 64
End: 2024-12-17
Payer: MEDICARE

## 2024-12-17 ENCOUNTER — OFFICE VISIT (OUTPATIENT)
Dept: ORTHOPEDIC SURGERY | Facility: HOSPITAL | Age: 64
End: 2024-12-17
Payer: MEDICARE

## 2024-12-17 DIAGNOSIS — M25.461 KNEE EFFUSION, RIGHT: ICD-10-CM

## 2024-12-17 DIAGNOSIS — M76.32 IT BAND SYNDROME, LEFT: ICD-10-CM

## 2024-12-17 DIAGNOSIS — M25.562 CHRONIC KNEE PAIN AFTER TOTAL REPLACEMENT OF BOTH KNEE JOINTS: ICD-10-CM

## 2024-12-17 DIAGNOSIS — G89.28 CHRONIC KNEE PAIN AFTER TOTAL REPLACEMENT OF BOTH KNEE JOINTS: ICD-10-CM

## 2024-12-17 DIAGNOSIS — M76.31 ILIOTIBIAL BAND TENDONITIS, RIGHT: ICD-10-CM

## 2024-12-17 DIAGNOSIS — M25.462 KNEE EFFUSION, LEFT: ICD-10-CM

## 2024-12-17 DIAGNOSIS — Z96.653 CHRONIC KNEE PAIN AFTER TOTAL REPLACEMENT OF BOTH KNEE JOINTS: Primary | ICD-10-CM

## 2024-12-17 DIAGNOSIS — M25.561 CHRONIC KNEE PAIN AFTER TOTAL REPLACEMENT OF BOTH KNEE JOINTS: Primary | ICD-10-CM

## 2024-12-17 DIAGNOSIS — Z96.653 CHRONIC KNEE PAIN AFTER TOTAL REPLACEMENT OF BOTH KNEE JOINTS: ICD-10-CM

## 2024-12-17 DIAGNOSIS — M25.562 CHRONIC KNEE PAIN AFTER TOTAL REPLACEMENT OF BOTH KNEE JOINTS: Primary | ICD-10-CM

## 2024-12-17 DIAGNOSIS — G89.28 CHRONIC KNEE PAIN AFTER TOTAL REPLACEMENT OF BOTH KNEE JOINTS: Primary | ICD-10-CM

## 2024-12-17 DIAGNOSIS — M25.561 CHRONIC KNEE PAIN AFTER TOTAL REPLACEMENT OF BOTH KNEE JOINTS: ICD-10-CM

## 2024-12-17 LAB
CLARITY FLD: ABNORMAL
COLOR FLD: ABNORMAL
CRP SERPL-MCNC: 0.43 MG/DL
CRYSTALS FLD MICRO: NORMAL
ERYTHROCYTE [SEDIMENTATION RATE] IN BLOOD BY WESTERGREN METHOD: 23 MM/H (ref 0–20)
RBC # FLD AUTO: 2000 /UL
WBC # FLD AUTO: 315 /UL

## 2024-12-17 PROCEDURE — 99214 OFFICE O/P EST MOD 30 MIN: CPT | Performed by: FAMILY MEDICINE

## 2024-12-17 PROCEDURE — 89050 BODY FLUID CELL COUNT: CPT | Performed by: FAMILY MEDICINE

## 2024-12-17 PROCEDURE — 87070 CULTURE OTHR SPECIMN AEROBIC: CPT | Mod: AHULAB | Performed by: FAMILY MEDICINE

## 2024-12-17 PROCEDURE — 36415 COLL VENOUS BLD VENIPUNCTURE: CPT

## 2024-12-17 PROCEDURE — 20611 DRAIN/INJ JOINT/BURSA W/US: CPT | Mod: 50 | Performed by: FAMILY MEDICINE

## 2024-12-17 PROCEDURE — 86140 C-REACTIVE PROTEIN: CPT

## 2024-12-17 PROCEDURE — 99214 OFFICE O/P EST MOD 30 MIN: CPT | Mod: 25 | Performed by: FAMILY MEDICINE

## 2024-12-17 PROCEDURE — 85652 RBC SED RATE AUTOMATED: CPT

## 2024-12-17 PROCEDURE — 89060 EXAM SYNOVIAL FLUID CRYSTALS: CPT | Mod: AHULAB | Performed by: FAMILY MEDICINE

## 2024-12-17 RX ORDER — NAPROXEN 500 MG/1
500 TABLET ORAL
Qty: 60 TABLET | Refills: 1 | Status: SHIPPED | OUTPATIENT
Start: 2024-12-17 | End: 2025-02-15

## 2024-12-17 NOTE — PROGRESS NOTES
Patient ID: Asif Villarrael is a 64 y.o. male.    L Inj/Asp: bilateral knee on 12/17/2024 12:02 PM  Indications: pain  Details: 18 G needle, ultrasound-guided superolateral approach  Aspirate (Right): yellow  Aspirate (Left): 33 mL; sent for lab analysis  Outcome: tolerated well, no immediate complications  Procedure, treatment alternatives, risks and benefits explained, specific risks discussed. Consent was given by the patient. Immediately prior to procedure a time out was called to verify the correct patient, procedure, equipment, support staff and site/side marked as required. Patient was prepped and draped in the usual sterile fashion.       Sports Medicine Office Note    Today's Date:  12/17/2024     HPI: Asif Villarreal is a 64 y.o. 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     On 1/15/2021 he presented for evaluation of bilateral knee pain for possible ultrasound-guided injections upon referral by Dr. Perkins and his NP. Leonidas has been a competitive and recreational athlete for most of his life and has had early onset arthrosis of both knees. 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 He has had significant postoperative pain at the left knee, specifically at the IT band since his surgery. He has good friends with Dr. Blanchard who recently injected his right knee for arthritis. He got minimal relief from that injection. He has been diagnosed with CPRS and is receiving sympathetic blocks by Dr. Sewell. The first 1 gave him 6 to 7 weeks of complete pain relief and then his pain immediately returned. The second block gave him no benefit whatsoever. He recently underwent an MRI of the left knee for further evaluation of his chronic pain status post knee replacement. He has seen Brice Blanchard and Oumar both for additional opinions. Dr. Perkins also recommended that he see Dr. Huitron at [facility] for another opinion.  He has not had any treatments at his iliotibial band. It sounds like he had hyper aggressive postoperative therapy when he went to PT last year. He has not had any recent therapy. He is very frustrated with his chronic pain and he reports it is debilitating. We had a long discussion regarding his chronic pain and agreed that he has severely tight iliotibial bands and tendinitis. He also has significant CRPS which seems to be incompletely treated at this time. We agreed upon a local cortisone injection at the distal left IT band. This produced immediate symptom relief and he had decreased allodynia, hyperalgesia and improved range of motion without pain. We also agreed upon repeating a intra-articular cortisone injection at the right knee which also gave him great pain relief. He was extremely happy with his decreased pain and increased range of motion before leaving the exam room. He was able to walk without pain which she had not done for some time. We agreed to check inflammatory markers and a lab order was given. I strongly recommend that he start formal physical therapy and gave him an order, but I would like for him to check with Dr. Perkins to make sure he is okay with this recommendation. I also think that if his CRPS symptoms flare back up then he should definitely follow-up with Dr. Sewell. I will see him back in 4 weeks for recheck.     On 2/5/2021, he returns returns for 3-week follow-up of bilateral knee pain, left worse than right. Overall he feels better but there is still soreness. They are not as bad as they were previously. The left knee is without stiffness. He is riding the bicycle and walking around the block. There is still a large area that sensitive to touch around the lateral knee. He reports his right knee is still doing great from the cortisone injection. He went to one formal physical therapy setting and was advised to do home exercises. The therapist had emailed me with this  recommendation. We also discussed his previous treatments and he does have gabapentin 600 mg but he only takes it occasionally. He denies interval injury or trauma. We discussed that he has had significant improvement with our conservative treatment plan since his last visit. He has responded very well to the cortisone injections both inside the right knee joint and outside the left knee. Still very concerned that he has complex regional pain syndrome at the left knee/CRPS and strongly recommend that he restart gabapentin daily and go back to formal physical therapy for treatment for this problem. I will email his therapist and if he does not treat this we will find someone who will. I think this is important that we address through tactile desensitization. The right knee is doing very well. He understands that the cortisone injection can be repeated every 3 months or later as needed. His labs are all normal. He will follow-up in the next 1 month or sooner as needed.     On 3/19/2021, he returns for a 6-week follow-up of bilateral knee pain, left worse than right. Overall he feels much better than his previous visits. He reports the right knee is 50% better and the left knee is 20% better today. He was feeling better earlier in the week after having therapy. He feels the occupational therapy for the CRPS is helping greatly. He is taking the gabapentin 300 mg twice dailyâ€“3 times daily. He is not taking any anti-inflammatory at this time. His biggest complaint is swelling in his legs. When he wears compressive stockings this helps greatly. He recently saw his PCP and had blood work and was told that he did not have any arteriovascular disease. He denies interval injury or trauma. We agreed that he is doing much better than when I first met him in January. He has responded very well to her current conservative treatment plan. We need to get him on an anti-inflammatory and this was sent to the pharmacy. He will  continue on the gabapentin. He will continue with occupational therapy and home exercises. Activity modifications were reviewed. I do feel that he has some type of venous insufficiency causing peripheral edema in both legs and that he should talk to his PCP at the [facility] about this. He might need to be on a diuretic or other medication. His labs are not available for my review.      On 6/17/2021, he returns for 3-month follow-up of chronic bilateral knee pain, left worse than right. He reports that his pain is gradually worsened over the last few weeks. The previous cortisone injections gave him 5+ months of great relief. He has been taking the gabapentin and meloxicam as instructed. This is helping him sleep at night. He is wearing the compression socks at both legs. He denies interval injury or trauma. He is requesting analgesia for his chronic knee pain. We agreed to repeat a cortisone injection into his right knee to help with his chronic pain. He tolerated this well. Activity modifications were reviewed. He understands that this can be repeated every 3 months or later. We may want to consider viscosupplementation in the future.  Regarding his chronic left knee pain status post TKA, he understands I will not give him cortisone into the joint. We agreed that he does not need a repeat injection at the distal IT band at this time. He will continue with his oral meloxicam, oral gabapentin, and topical diclofenac that seems to be helping his symptoms. I strongly encouraged him to continue his treatment plan for CRPS as he is doing much better now than he was 6 months ago. I am happy to see him back in the next 3 months or as needed.     On 10/26/2021, he returns for recheck of chronic bilateral knee pain status post left knee replacement. The previous cortisone injection at the right knee given on 6/17/2021 has worn off and his pain has returned. He is having swelling. He is also getting pain down the distal IT band at  the right. The left knee pain has returned but not nearly as bad as before his injection in January 2021. Previous cortisone injection gave him great relief for over 6 weeks and is now down to 60 to 65%. He is starting to get pain down the IT band. He would like injections at both knees. He would also like to come off his gabapentin if possible. He needs a refill of his meloxicam. He denies interval injury or trauma. We agreed to repeat a cortisone injection into his right knee to help with his chronic pain. He tolerated this well. Activity modifications were reviewed. He understands that this can be repeated every 3 months or later. We may want to consider viscosupplementation in the future before ultimately undergoing knee replacement.  Regarding his chronic left knee pain status post TKA, we reviewed and he understands I will not give him cortisone into the joint. We agreed to repeat injection at the distal IT band at this time. Tolerated this well. He will continue with his oral meloxicam, oral gabapentin, and topical diclofenac that seems to be helping his symptoms. We agreed that his CRPS has improved but I do not feel that it has completely resolved upon his exam findings today. We will try a gradual stepdown of his gabapentin      On 7/14/2022, he returns for 9-month follow-up of chronic bilateral knee pain. He is requesting repeat treatment with cortisone injection similar to his previous visits. He is still having lateral left knee postoperative pain at the distal IT band. The right knee DJD is also flaring up with pain. He is scheduled to have replacement with Dr. Perkins in November. Today he would like long-term analgesia. He denies interval injury or trauma. He still playing golf regularly. He did stop the gabapentin 2 months ago that was helping his CRPS type I which has improved greatly. He is not having any of the allodynia or hyperalgesia.   We agreed to repeat a cortisone injection into his  right knee to help with his chronic pain. He tolerated this well. Activity modifications were reviewed. He understands that this can be repeated every 3 months or later. He is scheduled in November 2022 to undergo knee replacement.  Regarding his chronic left knee pain status post TKA, we reviewed and he understands I will not give him cortisone into the joint. We agreed to repeat injection at the distal IT band at this time. He tolerated this well. He is doing well without gabapentin for the CRPS which, hopefully has resolved. We discussed PRP at the distal IT band and he would be an eligible candidate. He understands that this is not covered by insurance and would be an out-of-pocket expense. He would like to consider this in early September after today's injection calms down his pain.  I am happy to see him back in the next 3 to 4 months or sooner as needed.     On 9/9/2022, he returns for follow-up of chronic left lateral knee pain status post total knee replacement and with chronic iliotibial band tendinitis, and to begin a trial of PRP to the area. He understands this is not covered by insurance and is an out-of-pocket expense. He reports the previous cortisone injection at his right knee at his last visit helped him greatly. The cortisone injection around the distal IT band at his last visit also helped greatly. This to help getting through his child's wedding celebration. He is now wanting to attempt long-term analgesia at the distal lateral right knee. He denies interval injury or trauma. He has continued to play golf and with Dr. Blanchard.   We agreed to start PRP treatment today. We were successful obtaining blood specimen out of the left antecubital fossa. PRP was injected into the lateral left knee around the distal iliotibial band without any complications. We reviewed the post procedure protocol including nonweightbearing, crutch assisted ambulation for 2 days and then progressing off the crutches  as tolerated. He will follow-up in 6 weeks for recheck.     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 He is very happy with his improvement. He reports she is 85% better with her PRP treatment. He denies interval injury or trauma. He is scheduled to undergo right knee replacement within the next 10 days with Dr. Perkins.  We agreed that he responded very well to PRP injection over the lateral left knee. We discussed that this could possibly be repeated in the future depending on how long he gets relief. He will continue his current conservative treatment plan otherwise. He will undergo right knee replacement with Dr. Perkins soon. I am happy to see him back when his pain returns.     4/9/2024, he returns for an 18-month follow-up of chronic lateral knee and thigh pain with distal IT band symptoms that we have treated successfully in the past with PRP.  He reports 16 to 18 months of great improvement after the PRP injection on 10/21/2022 for his chronic recalcitrant distal IT band pain status post left knee replacement.  He has been very active and playing lots of golf.  The pain has returned over the last 1 to 2 months without injury or trauma.  He reports meloxicam has helped greatly in addition to the shot, this gives him 50 to 70% improvement while taking it but he is out of the medicine.  He denies direct injury or trauma.  He has no problems with the right knee status post replacement with Dr. Perkins.  We agreed to treat him conservatively with a referral to formal physical therapy and to restart meloxicam daily.  We did review the possibility of repeating PRP and I explained to him that the price has gone up but also the current version gives a 2-3 times the amount of concentrated blood products available to help with his symptoms.  He understands this is still not  covered by insurance and would be an out-of-pocket expense.  I like for him to wait a minimum of 6 to 8 weeks before we proceed with this option.  He also asked about this being inflammatory arthritis; Dr. Blanchard mention to him about this several times.  He would like to get lab work checked.  Will go ahead and obtain labs and he can follow-up with his PCP or rheumatology as needed.    Today, 12/17/2024, he returns for an 8-month follow-up of chronic lateral knee pain and now swelling both knees.  He has since undergone bilateral knee replacement with Dr. Perkins and we had been successfully treating his distal IT band at the left knee with conservative management.  The best and longest duration was a PRP injection that lasted almost 2 years.  We last saw him in April and he responded very well to formal physical therapy and restarting meloxicam.  He said that got him through the summer and he was able to play lots of golf.  In October he spent 3 weeks in [place], and spent Thanksgiving in [place] with his son but was very miserable.  Otherwise he has been in [place] since his last visit.  He denies direct injury or trauma.  He denies fevers, chills or other complaints.  Both knees are very stiff and diffusely painful today.  He has trouble getting in and out of chairs and up and down from beds and tables.    He has no other complaints.    Physical Examination:     The RIGHT knee has grade 1-2 joint effusion. Patella crepitus is positive. There is minimal tenderness to the medial and severe tenderness to the lateral joint lines. Flexion and extension are without mechanical blocking but cause significant pain. There is no instability with stress testing.   The LEFTknee has grade 1-2 joint effusion. Patella crepitus is positive. There is minimal tenderness to the medial and severe tenderness to the lateral joint lines. Flexion and extension are without mechanical blocking but cause significant pain. There is no  instability with stress testing.   Both IT bands are very tight and painful over the distal half.  Both hips have full pain-free range of motion without instability or blocking.  Skin - no rashes, sores, or open lesions. Strength, sensory and vascular exams are otherwise normal. There is no clubbing, cyanosis or edema.  Gait is antalgic and tandem.    Imaging:  === 12/16/22 ===  KNEE  - Impression -  No hardware failure about right total knee arthroplasty.  Moderate-sized effusion      Procedure Note:  Procedure #1: After consent was obtained, the RIGHT knee was prepped in a sterile fashion. Ultrasound guidance was used to help insure proper needle placement into the knee joint, decrease patient discomfort, and decrease collateral damage. The joint was aspirated of 28 mL of clear, straw-colored joint fluid without any complications. Ultrasound images were saved on an internal file for later reference. The patient tolerated the procedure well and the area was cleaned and bandaged.    Procedure #2: After consent was obtained, the LEFT knee was prepped in a sterile fashion. Ultrasound guidance was used to help insure proper needle placement into the knee joint, decrease patient discomfort, and decrease collateral damage. The joint was aspirated of 33 mL of clear, straw-colored joint fluid without any complications.  This fluid was sent to the laboratory.  Ultrasound images were saved on an internal file for later reference. The patient tolerated the procedure well and the area was cleaned and bandaged.    Status post procedure, he had increased active and passive range of motion at both knees and much less pain than prior to procedure.    Problem List Items Addressed This Visit             ICD-10-CM    It band syndrome, left M76.32    Relevant Medications    naproxen (Naprosyn) 500 mg tablet    Other Relevant Orders    Referral to Physical Therapy     Other Visit Diagnoses         Codes    Chronic knee pain after total  replacement of both knee joints    -  Primary M25.561, M25.562, G89.28, Z96.653    Relevant Medications    naproxen (Naprosyn) 500 mg tablet    Other Relevant Orders    Point of Care Ultrasound (Completed)    L Inj/Asp: bilateral knee    Referral to Physical Therapy    Crystal Identification and Pathologist Review Synovial Fluid    Body Fluid Cell Count (Completed)    Sterile Fluid Culture/Smear    Sedimentation rate, automated (Completed)    C-reactive protein (Completed)    Knee effusion, left     M25.462    Relevant Medications    naproxen (Naprosyn) 500 mg tablet    Other Relevant Orders    Point of Care Ultrasound (Completed)    L Inj/Asp: bilateral knee    Referral to Physical Therapy    Crystal Identification and Pathologist Review Synovial Fluid    Body Fluid Cell Count (Completed)    Sterile Fluid Culture/Smear    Sedimentation rate, automated (Completed)    C-reactive protein (Completed)    Knee effusion, right     M25.461    Relevant Medications    naproxen (Naprosyn) 500 mg tablet    Other Relevant Orders    Point of Care Ultrasound (Completed)    L Inj/Asp: bilateral knee    Referral to Physical Therapy    Crystal Identification and Pathologist Review Synovial Fluid    Body Fluid Cell Count (Completed)    Sterile Fluid Culture/Smear    Sedimentation rate, automated (Completed)    C-reactive protein (Completed)    Iliotibial band tendonitis, right     M76.31    Relevant Medications    naproxen (Naprosyn) 500 mg tablet    Other Relevant Orders    Referral to Physical Therapy            Assessment and Plan:     We reviewed the exam and x-ray findings and discussed the conservative and surgical treatment options. We agreed to aspirate both knees and send fluid to the lab for further evaluation.  He tolerated this well and had significantly improved range of motion and much better pain control than prior to the procedure.  I am unsure what is causing his joint effusions after joint replacement.  We will rule  out gout with joint aspirate crystal evaluation and serum uric acid.  We will also get the routine fluid analysis and check serum sed rate and CRP.  Hopefully this is something simple as gout but I am concerned that he may be suffering from metallosis but will defer that and possible workup to Dr. Perkins.  I will reach out to he and JASMYNE Richmond to establish follow-up for Asif.  Will start him on naproxen and refer him to physical therapy for his tight IT bands.  I am happy to see him back as needed.    **This note was dictated using Dragon speech recognition software and was not corrected for spelling or grammatical errors**.    Lloyd Perez MD  Sports Medicine Specialist  [facility] [place]

## 2024-12-18 ENCOUNTER — APPOINTMENT (OUTPATIENT)
Dept: ORTHOPEDIC SURGERY | Facility: HOSPITAL | Age: 64
End: 2024-12-18
Payer: MEDICARE

## 2024-12-20 ENCOUNTER — HOSPITAL ENCOUNTER (OUTPATIENT)
Dept: RADIOLOGY | Facility: CLINIC | Age: 64
Discharge: HOME | End: 2024-12-20
Payer: MEDICARE

## 2024-12-20 ENCOUNTER — OFFICE VISIT (OUTPATIENT)
Dept: ORTHOPEDIC SURGERY | Facility: CLINIC | Age: 64
End: 2024-12-20
Payer: MEDICARE

## 2024-12-20 DIAGNOSIS — M25.561 RIGHT KNEE PAIN, UNSPECIFIED CHRONICITY: ICD-10-CM

## 2024-12-20 DIAGNOSIS — M25.562 LEFT KNEE PAIN, UNSPECIFIED CHRONICITY: ICD-10-CM

## 2024-12-20 LAB
BACTERIA FLD CULT: NORMAL
GRAM STN SPEC: NORMAL
GRAM STN SPEC: NORMAL

## 2024-12-20 PROCEDURE — 1159F MED LIST DOCD IN RCRD: CPT | Performed by: PHYSICIAN ASSISTANT

## 2024-12-20 PROCEDURE — 1036F TOBACCO NON-USER: CPT | Performed by: PHYSICIAN ASSISTANT

## 2024-12-20 PROCEDURE — 99214 OFFICE O/P EST MOD 30 MIN: CPT | Performed by: PHYSICIAN ASSISTANT

## 2024-12-20 PROCEDURE — 73560 X-RAY EXAM OF KNEE 1 OR 2: CPT | Mod: 50

## 2024-12-20 PROCEDURE — 1160F RVW MEDS BY RX/DR IN RCRD: CPT | Performed by: PHYSICIAN ASSISTANT

## 2024-12-20 ASSESSMENT — ENCOUNTER SYMPTOMS
WEAKNESS: 0
WHEEZING: 0
CONSTITUTIONAL NEGATIVE: 1
WOUND: 0
PALPITATIONS: 0
COLOR CHANGE: 0
SHORTNESS OF BREATH: 0
JOINT SWELLING: 1
CHILLS: 0
ARTHRALGIAS: 0
ACTIVITY CHANGE: 0
LIGHT-HEADEDNESS: 0
FATIGUE: 0
COUGH: 0
CONFUSION: 0
VOMITING: 0
BLOOD IN STOOL: 0
NAUSEA: 0
HEMATOLOGIC/LYMPHATIC NEGATIVE: 1
CARDIOVASCULAR NEGATIVE: 1
HEMATURIA: 0
AGITATION: 0
ABDOMINAL PAIN: 0
DYSURIA: 0
ENDOCRINE NEGATIVE: 1
ALLERGIC/IMMUNOLOGIC NEGATIVE: 1
RESPIRATORY NEGATIVE: 1
FEVER: 0
BACK PAIN: 0
EYES NEGATIVE: 1
DIFFICULTY URINATING: 0
DIZZINESS: 0
CHEST TIGHTNESS: 0
FREQUENCY: 0
NERVOUS/ANXIOUS: 0

## 2024-12-20 NOTE — PROGRESS NOTES
BRADY Mccann, PANorthC, ATC  Adult Reconstruction and Joint Replacement Surgery  Phone: [phone]     Fax:[phone]             No chief complaint on file.      OLIVERIO Villarreal is a pleasant 64 y.o. year-old male who is seen today for evaluation of side: bilateral knee pain.  Patient underwent bilateral knee arthroplasties by Dr.Steven Perkins 4 years ago on the left side and 2 years ago on the right.  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  He is followed by pain management.  He has had chronic knee effusions that have been drained by Dr. Perez most recently this past Tuesday where he had 33 cc on the left aspirated of clear straw-colored fluid and 23 cc on the right.  Cultures were obtained with no growth thus far.  ESR was slightly elevated at 23 and a CRP was normal.  He has had a full rheumatologic workup and is continuing with physical therapy.  He states that he has stiffness greater than pain in his bilateral knees to the point of debilitation.  He has tried physical therapy and cortisone injections in his left IT band without any relief.  He has been struggling with both knees for quite some time now.  He is also had PRP injections in his total joints with some relief.  He is fed up with his knees given how stiff they are.    Patient has reported compliance with antibiotic therapy after total joint and dentition intact without carries.    Review of Systems   Constitutional: Negative.  Negative for activity change, chills, fatigue and fever.   HENT: Negative.     Eyes: Negative.    Respiratory: Negative.  Negative for cough, chest tightness, shortness of breath and wheezing.    Cardiovascular: Negative.  Negative for palpitations.   Gastrointestinal:  Negative for abdominal pain, blood in stool, nausea and vomiting.   Endocrine: Negative.  Negative for cold intolerance and polyuria.   Genitourinary: Negative.  Negative for difficulty urinating, dysuria, frequency,  hematuria and urgency.   Musculoskeletal:  Positive for gait problem and joint swelling. Negative for arthralgias and back pain.   Skin: Negative.  Negative for color change, pallor, rash and wound.   Allergic/Immunologic: Negative.  Negative for environmental allergies.   Neurological:  Negative for dizziness, weakness and light-headedness.   Hematological: Negative.    Psychiatric/Behavioral:  Negative for agitation, confusion and suicidal ideas. The patient is not nervous/anxious.    All other systems reviewed and are negative.      There were no vitals filed for this visit.    No past medical history on file.  Patient Active Problem List   Diagnosis    Chronic pain of left knee    It band syndrome, left    Carpal tunnel syndrome, bilateral    Trigger finger, left middle finger       Medication Documentation Review Audit       Reviewed by Lloyd Polo ATC () on 04/09/24 at 0855      Medication Order Taking? Sig Documenting Provider Last Dose Status   amitriptyline (Elavil) 25 mg tablet [id number]  Take by mouth. Historical Provider, MD  Active   aspirin 81 mg chewable tablet [id number]  Chew. Historical Provider, MD  Active   atorvastatin (Lipitor) 80 mg tablet [id number]  Take by mouth. Historical Provider, MD  Active   cholecalciferol (Vitamin D-3) 25 MCG (1000 UT) tablet [id number]  Take 1 tablet (25 mcg) by mouth once daily. Historical Provider, MD  Active   docusate sodium (Colace) 100 mg capsule [id number]  Take by mouth. Historical Provider, MD  Active   felodipine ER (Plendil) 10 mg 24 hr tablet [id number]  Take by mouth. Historical Provider, MD  Active   gabapentin (Neurontin) 600 mg tablet [id number]  Take 1 tablet (600 mg) by mouth 3 times a day. Historical Provider, MD  Active   gemfibrozil (Lopid) 600 mg tablet [id number]  Take by mouth. Historical Provider, MD  Active   lisinopril 10 mg tablet [id number]  Take by mouth. Historical Provider, MD  Active   lisinopril 20 mg tablet [id number]   Take by mouth. Historical Provider, MD  Active   lisinopriL-hydrochlorothiazide 20-25 mg tablet [id number]  Take by mouth. Historical Provider, MD  Active   metFORMIN (Glucophage) 850 mg tablet [id number]  Take by mouth. Historical Provider, MD  Active   methocarbamol (Robaxin) 750 mg tablet [id number]  Take by mouth. Historical Provider, MD  Active   multivitamin with minerals (multivit-min-iron fum-folic ac) tablet [id number]  Take 1 tablet by mouth once daily. Historical Provider, MD  Active   mupirocin (Bactroban) 2 % ointment [id number]  Mupirocin 2 % External Ointment   Quantity: 22  Refills: 0        Start : 6-Feb-2020   Active Historical Provider, MD  Active   omega-3s-dha-epa-fish oil (Sea-Omega) 200 mg-300 mg- 100 mg-1,000 mg capsule [id number]  Take by mouth. Historical Provider, MD  Active   pantoprazole (ProtoNix) 20 mg EC tablet [id number]  Take by mouth. Historical Provider, MD  Active   triamcinolone acetonide (Kenalog) 40 mg/mL injection [id number]  as directed Historical Provider, MD  Active   valACYclovir (Valtrex) 1 gram tablet [id number]  Take by mouth. Historical Provider, MD  Active                    No Known Allergies    Social History     Socioeconomic History    Marital status:      Spouse name: Not on file    Number of children: Not on file    Years of education: Not on file    Highest education level: Not on file   Occupational History    Not on file   Tobacco Use    Smoking status: Never    Smokeless tobacco: Never   Substance and Sexual Activity    Alcohol use: Not on file    Drug use: Not on file    Sexual activity: Not on file   Other Topics Concern    Not on file   Social History Narrative    Not on file     Social Drivers of Health     Financial Resource Strain: Low Risk  (11/14/2024)    Received from [facility]    Overall Financial Resource Strain (CARDIA)     Difficulty of Paying Living Expenses: Not hard at all   Food Insecurity: No Food Insecurity (11/14/2024)    Received  from [facility]    Hunger Vital Sign     Worried About Running Out of Food in the Last Year: Never true     Ran Out of Food in the Last Year: Never true   Transportation Needs: No Transportation Needs (11/14/2024)    Received from [facility]    PRAPARE - Transportation     Lack of Transportation (Medical): No     Lack of Transportation (Non-Medical): No   Physical Activity: Sufficiently Active (11/14/2024)    Received from [facility]    Exercise Vital Sign     Days of Exercise per Week: 4 days     Minutes of Exercise per Session: 80 min   Stress: No Stress Concern Present (11/14/2024)    Received from [facility]    Citizen of [place] of Occupational Health - Occupational Stress Questionnaire     Feeling of Stress : Not at all   Social Connections: Unknown (11/14/2024)    Received from [facility]    Social Connection and Isolation Panel [NHANES]     Frequency of Communication with Friends and Family: Patient declined     Frequency of Social Gatherings with Friends and Family: Patient declined     Attends Judaism Services: Patient declined     Active Member of Clubs or Organizations: Yes     Attends Club or Organization Meetings: Patient declined     Marital Status:    Intimate Partner Violence: Not on file   Housing Stability: Low Risk  (5/8/2023)    [facility]    Housing Stability Vital Sign     Unable to Pay for Housing in the Last Year: No     Number of Places Lived in the Last Year: 1     Unstable Housing in the Last Year: No       No past surgical history on file.    There is no height or weight on file to calculate BMI.    A1C  Lab Results   Component Value Date    HGBA1C 5.9 (H) 11/19/2024    MBINZGLW6S 123 11/19/2024       Physical Exam  side: bilateral Knee  There were no vitals filed for this visit.  General: Well-appearing male in no acute distress.  Awake, alert and oriented.  Pleasant and cooperative.  Respiratory: Non-labored  breathing  Mood: Euthymic   Gait: Antalgic  Assistive Device: no device  Skin: warm, dry and intact without lesions  The incision is well healed midline with no signs of surrounding infection, dehiscence or drainage.   No instability varus or valgus stressing the knee at 0, 30 or 60 degrees.    No instability in the AP plane at 90 degrees.  Range of motion: 0 degrees extension, 120 degrees flexion  5/5 hip flexion/knee extension/DF/PF/EHL  SILT in fransisco/saph/ per/tib distribution   Extremities warm and well perfused.  No lower extremity calf tenderness, warmth or swelling. Lower extremity well perfused  2+ Femoral/DP/PT pulses bilaterally    Imaging:     I personally reviewed multiple views of the bilateral knees today.  He has a well-fixed well aligned bilateral total knee arthroplasty.  There is no evidence of loosening or failure.  Impression/Plan:    Asif Villarreal and I discussed the etiology of symptoms.  We discussed in detail a treatment plan that he/she is comfortable with.    Painful Total Knee-I had a lengthy discussion with Asif today that this sounds like he has either a C RPS flare that is contributing to his stiffness.  ESR was slightly elevated which is not concerning and a CRP was completely normal.  Will await for his aspiration results but they look to be tentatively normal.  He is leaving for [place] at the beginning of the year.  The best we can do at this point in terms of treatment would be to have him continue either meloxicam or Naprosyn.  I discussed that he can take 1 or the other but not both.  He can continue to ice and use Voltaren cream.  I would like him to follow-up with Dr.Steven Perkins when he returns in April to discuss further options.  In the interim, he can also continue with pain management.  All questions were answered.    Follow-up JANN     Vicenta Riddle, MPAS, PA-C ATC  Orthopedic Physician Assisant  Adult Reconstruction and Total Joint Replacement  General  Orthopedics  Department of Orthopaedic Surgery  [place] [address]  [employer] messaging preferred

## 2024-12-20 NOTE — LETTER
December 20, 2024     Ramon Christianson MD  [address] [facility] [address]    Patient: Asif Villarreal   YOB: 1960   Date of Visit: 12/20/2024       Dear Dr. Ramon Christianson MD:    Thank you for referring Asif Villarreal to me for evaluation. Below are my notes for this consultation.  If you have questions, please do not hesitate to call me. I look forward to following your patient along with you.       Sincerely,     Vicenta Riddle PA-C      CC: No Recipients  ______________________________________________________________________________________            BRADY Mccann PA-C, ATC  Adult Reconstruction and Joint Replacement Surgery  Phone: [phone]     Fax:[phone]             No chief complaint on file.      HPI    Asif Villarreal is a pleasant 64 y.o. year-old male who is seen today for evaluation of side: bilateral knee pain.  Patient underwent bilateral knee arthroplasties by Dr.Steven Perkins 4 years ago on the left side and 2 years ago on the right.  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  He is followed by pain management.  He has had chronic knee effusions that have been drained by Dr. Perez most recently this past Tuesday where he had 33 cc on the left aspirated of clear straw-colored fluid and 23 cc on the right.  Cultures were obtained with no growth thus far.  ESR was slightly elevated at 23 and a CRP was normal.  He has had a full rheumatologic workup and is continuing with physical therapy.  He states that he has stiffness greater than pain in his bilateral knees to the point of debilitation.  He has tried physical therapy and cortisone injections in his left IT band without any relief.  He has been struggling with both knees for quite some time now.  He is also had PRP injections in his total joints with some relief.  He is fed up with his knees given how stiff they are.    Patient has reported compliance with antibiotic therapy after total joint and  dentition intact without carries.    Review of Systems   Constitutional: Negative.  Negative for activity change, chills, fatigue and fever.   HENT: Negative.     Eyes: Negative.    Respiratory: Negative.  Negative for cough, chest tightness, shortness of breath and wheezing.    Cardiovascular: Negative.  Negative for palpitations.   Gastrointestinal:  Negative for abdominal pain, blood in stool, nausea and vomiting.   Endocrine: Negative.  Negative for cold intolerance and polyuria.   Genitourinary: Negative.  Negative for difficulty urinating, dysuria, frequency, hematuria and urgency.   Musculoskeletal:  Positive for gait problem and joint swelling. Negative for arthralgias and back pain.   Skin: Negative.  Negative for color change, pallor, rash and wound.   Allergic/Immunologic: Negative.  Negative for environmental allergies.   Neurological:  Negative for dizziness, weakness and light-headedness.   Hematological: Negative.    Psychiatric/Behavioral:  Negative for agitation, confusion and suicidal ideas. The patient is not nervous/anxious.    All other systems reviewed and are negative.      There were no vitals filed for this visit.    No past medical history on file.  Patient Active Problem List   Diagnosis   • Chronic pain of left knee   • It band syndrome, left   • Carpal tunnel syndrome, bilateral   • Trigger finger, left middle finger       Medication Documentation Review Audit       Reviewed by Lloyd Polo ATC () on 04/09/24 at 0855      Medication Order Taking? Sig Documenting Provider Last Dose Status   amitriptyline (Elavil) 25 mg tablet [id number]  Take by mouth. Historical Provider, MD  Active   aspirin 81 mg chewable tablet [id number]  Chew. Historical Provider, MD  Active   atorvastatin (Lipitor) 80 mg tablet [id number]  Take by mouth. Historical Provider, MD  Active   cholecalciferol (Vitamin D-3) 25 MCG (1000 UT) tablet [id number]  Take 1 tablet (25 mcg) by mouth once daily.  Historical Provider, MD  Active   docusate sodium (Colace) 100 mg capsule [id number]  Take by mouth. Historical Provider, MD  Active   felodipine ER (Plendil) 10 mg 24 hr tablet [id number]  Take by mouth. Historical Provider, MD  Active   gabapentin (Neurontin) 600 mg tablet [id number]  Take 1 tablet (600 mg) by mouth 3 times a day. Historical MD Eufemia  Active   gemfibrozil (Lopid) 600 mg tablet [id number]  Take by mouth. Historical Provider, MD  Active   lisinopril 10 mg tablet [id number]  Take by mouth. Historical Provider, MD  Active   lisinopril 20 mg tablet [id number]  Take by mouth. Historical Provider, MD  Active   lisinopriL-hydrochlorothiazide 20-25 mg tablet [id number]  Take by mouth. Historical Provider, MD  Active   metFORMIN (Glucophage) 850 mg tablet [id number]  Take by mouth. Historical Provider, MD  Active   methocarbamol (Robaxin) 750 mg tablet [id number]  Take by mouth. Historical Provider, MD  Active   multivitamin with minerals (multivit-min-iron fum-folic ac) tablet [id number]  Take 1 tablet by mouth once daily. Historical Provider, MD  Active   mupirocin (Bactroban) 2 % ointment [id number]  Mupirocin 2 % External Ointment   Quantity: 22  Refills: 0        Start : 6-Feb-2020   Active Historical Provider, MD  Active   omega-3s-dha-epa-fish oil (Sea-Omega) 200 mg-300 mg- 100 mg-1,000 mg capsule [id number]  Take by mouth. Historical Provider, MD  Active   pantoprazole (ProtoNix) 20 mg EC tablet [id number]  Take by mouth. Historical Provider, MD  Active   triamcinolone acetonide (Kenalog) 40 mg/mL injection [id number]  as directed Historical Provider, MD  Active   valACYclovir (Valtrex) 1 gram tablet [id number]  Take by mouth. Historical Provider, MD  Active                    No Known Allergies    Social History     Socioeconomic History   • Marital status:      Spouse name: Not on file   • Number of children: Not on file   • Years of education: Not on file   • Highest education level: Not on file    Occupational History   • Not on file   Tobacco Use   • Smoking status: Never   • Smokeless tobacco: Never   Substance and Sexual Activity   • Alcohol use: Not on file   • Drug use: Not on file   • Sexual activity: Not on file   Other Topics Concern   • Not on file   Social History Narrative   • Not on file     Social Drivers of Health     Financial Resource Strain: Low Risk  (11/14/2024)    Received from [facility]    Overall Financial Resource Strain (CARDIA)    • Difficulty of Paying Living Expenses: Not hard at all   Food Insecurity: No Food Insecurity (11/14/2024)    Received from [facility]    Hunger Vital Sign    • Worried About Running Out of Food in the Last Year: Never true    • Ran Out of Food in the Last Year: Never true   Transportation Needs: No Transportation Needs (11/14/2024)    Received from [facility]    PRAPARE - Transportation    • Lack of Transportation (Medical): No    • Lack of Transportation (Non-Medical): No   Physical Activity: Sufficiently Active (11/14/2024)    Received from [facility]    Exercise Vital Sign    • Days of Exercise per Week: 4 days    • Minutes of Exercise per Session: 80 min   Stress: No Stress Concern Present (11/14/2024)    Received from [facility]    [place] of Occupational Health - Occupational Stress Questionnaire    • Feeling of Stress : Not at all   Social Connections: Unknown (11/14/2024)    Received from [facility]    Social Connection and Isolation Panel [NHANES]    • Frequency of Communication with Friends and Family: Patient declined    • Frequency of Social Gatherings with Friends and Family: Patient declined    • Attends Religion Services: Patient declined    • Active Member of Clubs or Organizations: Yes    • Attends Club or Organization Meetings: Patient declined    • Marital Status:    Intimate Partner Violence: Not on file   Housing Stability: Low Risk  (5/8/2023)    Received from [facility],  [facility]    Housing Stability Vital Sign    • Unable to Pay for Housing in the Last Year: No    • Number of Places Lived in the Last Year: 1    • Unstable Housing in the Last Year: No       No past surgical history on file.    There is no height or weight on file to calculate BMI.    A1C  Lab Results   Component Value Date    HGBA1C 5.9 (H) 11/19/2024    [id number] 123 11/19/2024       Physical Exam  side: bilateral Knee  There were no vitals filed for this visit.  General: Well-appearing male in no acute distress.  Awake, alert and oriented.  Pleasant and cooperative.  Respiratory: Non-labored breathing  Mood: Euthymic   Gait: Antalgic  Assistive Device: no device  Skin: warm, dry and intact without lesions  The incision is well healed midline with no signs of surrounding infection, dehiscence or drainage.   No instability varus or valgus stressing the knee at 0, 30 or 60 degrees.    No instability in the AP plane at 90 degrees.  Range of motion: 0 degrees extension, 120 degrees flexion  5/5 hip flexion/knee extension/DF/PF/EHL  SILT in fransisco/saph/ per/tib distribution   Extremities warm and well perfused.  No lower extremity calf tenderness, warmth or swelling. Lower extremity well perfused  2+ Femoral/DP/PT pulses bilaterally    Imaging:     I personally reviewed multiple views of the bilateral knees today.  He has a well-fixed well aligned bilateral total knee arthroplasty.  There is no evidence of loosening or failure.  Impression/Plan:    Asif Villarreal and I discussed the etiology of symptoms.  We discussed in detail a treatment plan that he/she is comfortable with.    Painful Total Knee-I had a lengthy discussion with Asif today that this sounds like he has either a C RPS flare that is contributing to his stiffness.  ESR was slightly elevated which is not concerning and a CRP was completely normal.  Will await for his aspiration results but they look to be tentatively normal.  He is leaving for [place]  at the beginning of the year.  The best we can do at this point in terms of treatment would be to have him continue either meloxicam or Naprosyn.  I discussed that he can take 1 or the other but not both.  He can continue to ice and use Voltaren cream.  I would like him to follow-up with Dr.Steven Perkins when he returns in April to discuss further options.  In the interim, he can also continue with pain management.  All questions were answered.    Follow-up PRN     BRADY Mccann, PA-C ATC  Orthopedic Physician Assisant  Adult Reconstruction and Total Joint Replacement  General Orthopedics  Department of Orthopaedic Surgery  [place] [address]  [employer] messaging preferred

## 2024-12-31 ENCOUNTER — TREATMENT (OUTPATIENT)
Dept: PHYSICAL THERAPY | Facility: CLINIC | Age: 64
End: 2024-12-31
Payer: MEDICARE

## 2024-12-31 DIAGNOSIS — M76.32 IT BAND SYNDROME, LEFT: ICD-10-CM

## 2024-12-31 DIAGNOSIS — G89.29 CHRONIC PAIN OF LEFT KNEE: ICD-10-CM

## 2024-12-31 DIAGNOSIS — M25.562 CHRONIC PAIN OF LEFT KNEE: ICD-10-CM

## 2024-12-31 PROCEDURE — 97110 THERAPEUTIC EXERCISES: CPT | Mod: GP

## 2024-12-31 PROCEDURE — 97140 MANUAL THERAPY 1/> REGIONS: CPT | Mod: GP

## 2024-12-31 ASSESSMENT — PAIN - FUNCTIONAL ASSESSMENT: PAIN_FUNCTIONAL_ASSESSMENT: 0-10

## 2024-12-31 ASSESSMENT — PAIN SCALES - GENERAL: PAINLEVEL_OUTOF10: 0 - NO PAIN

## 2024-12-31 NOTE — PROGRESS NOTES
"  Physical Therapy  Physical Therapy Treatment    Patient Name: Asif Villarreal  MRN: [id number]  Today's Date: 12/31/2024  Time Calculation  Start Time: 1000  Stop Time: 1043  Time Calculation (min): 43 min    PT Therapeutic Procedures Time Entry  Manual Therapy Time Entry: 23  Therapeutic Exercise Time Entry: 20          Insurance:  Visit number: 9 of [place]  Authorization info: No auth  Insurance Type: Payor: MEDICARE / Plan: MEDICARE PART A AND B / Product Type: *No Product type* /      Current Problem  1. Chronic pain of left knee  Follow Up In Physical Therapy      2. It band syndrome, left  Follow Up In Physical Therapy            Referred by: Dr. Perez    General:  General  Reason for Referral: Left knee pain  Referred By: Dr. Perez    Precautions:  Precautions  Precautions Comment: CRPS history  Medical History Form: Reviewed (scanned into chart)    Subjective:   Patient reports he is doing ok today. Has been dealing w/ a lot of stiffness that has been limiting his ability to perform ADLs. Pain and stiffness was so severe when he was in [place] a few weeks ago he was unable to get out of chairs unassisted. He has noticed a severe amount of LLE swelling and skin \"tightness\" especially on days when he is not as active w/ his walking.    Pain:  Pain Assessment: 0-10  0-10 (Numeric) Pain Score: 0 - No pain (just moderate stiffness)       Objective:    ROM    Hip AROM (Degrees)      (R)  (L)  Flexion: wfl  wfl   Extension: wfl  wfl     Abduction: wfl  wfl     Adduction: wfl  wfl     ER:  wfl  wfl     IR:  wfl  wfl         Knee AROM (Degrees) updated 9/5       (R)  (L)  Flexion: 125  115      Extension: +2  +2         Knee PROM (Degrees) updated 9/5      (R)  (L)  Flexion: 125  120*      Extension: +2  +2     *guarding/empty end feel    Ankle AROM (Degrees)      (R)  (L)  Plantarflexion: wfl  wfl      Dorsiflexion: wfl  wfl     Inversion: wfl  wfl      Eversion: wfl  wfl            Strength Testing updated " 9/24    Hip    (R)  (L)  Flexion: 5/5 5/5      Extension: 5/5 5/5     Abduction: 5/5 5/5            Knee    (R)  (L)  Flexion: 5/5 5/5      Extension: 5/5 5/5            Functional Screening  Deferred   Squat:   Lunge:   SL Balance:   Lateral Step Down:   SL Quarter Squat:       Posture: kyphotic       Palpation updated 9/24: TTP w/ multiple trigger points present in lateral quads, ITB    Flexibility: updated 9/9  Elys: Pos L due to hips lifting off table  Eron:   Obers: neg B  Mirta: Neg B    Patella Mobility: Min-mod limitation in all directions LLE that improved w/ mobs      Observation: Mild LLE edema in ankle that has improved since last visit. Patient feels ankle pumps have been helping reduce edema.     Gait: Antalgic, decreased stance time LLE, mild trendelenburg. Improved post session.         Special Tests  Estelle compression: Pos due to pain reproduction over ITB insertion.      Outcome Measures:        Treatments:    Manual Therapy: 23'  STM gastroc/soleus  Passive hamstring stretching  PFJ mobs  STM quad, ITB       There-ex: 20'  Seated heel raises w/ 45# 2x25*  Standing heel raises off step 2x20*  Shuttle leg press w/ 75# 3x12  Ankle pumps every hour *  Educated on hourly walking importance      * = added to HEP.  Sheet with exercise descriptions and images issued.  Skilled intervention utilized in the appropriate selection & application of above exercises.  Verbal and tactile cues provided for proper form and technique.  Pt. demonstrated appropriate form & verbalized understanding of optimal technique for above exercises.    [facility] [place], [place].[employer]    Access Code: [email or web address]      EDUCATION: Home exercise program, plan of care, activity modifications, pain management, and injury pathology       Assessment:   Patient tolerated today's session w/ a reported reduction in overall stiffness and tightness. Patient's unilateral LE swelling and tightness could be an indication of lymphedema.  He will benefit from ongoing PT services to continue activities to promote gastroc/soleus strength and circulation to reach established goals to maximize functional mobility.          Plan:  Continue activities to promote gastroc/soleus strengthening and circulation. Potential referral to lymphedema PT specialist.     Goals: Set and discussed today  Active       Chronic L knee pain       LTGs (Progressing)       Start:  08/23/24    Expected End:  02/04/25       1. Patient will report a decrease in stiffness w/ stair negotiation by at least 2 points to meet the MDC and improve performance   2. Patient will be able to reciprocally negotiate stairs w/o a reproduction of familiar symptoms to improve ADL performance   3. Patient will report an improved score on LEFS by at least 9 points to meet the MDC  4. Patient will demonstrate improved strength during MMT hip abduction to 5/5 + pain free to improve ADLs.  5. Patient will tolerate resisted knee extension MMT w/o a production of familiar knee pain to improve ADLs.            Resolved       Left IT band syndrome       STGs (Met)       Start:  04/25/24    Expected End:  05/23/24    Resolved:  06/03/24    1. Patient will demonstrate independence w/ HEP to allow for self-management of symptoms  2. Patient will demonstrate increased strength during hip abduction and knee extension MMT to 5/5+ pain free to improve ADL performance.   3. Patient will demonstrate improved knee flexion ROM to equal unaffected side + pain free to improve ADLs.   4. Patient will report a decrease in pain by at least 2 points to meet the MCID  5. Patient will tolerate golfing 9 holes w/ a reported decrease in symptoms to progress towards long term goal          LTGs (Met)       Start:  04/25/24    Expected End:  06/20/24    Resolved:  06/03/24    1. Patient will demonstrate improved flexibility by having a negative Obers test B to decrease tension on the IT band during functional mobility.   2.  Patient will be able to reciprocally negotiate stairs w/o a reproduction of familiar symptoms to improve ADL performance and demonstrate an improved capacity for functional mobility.   3. Patient will report an improved score on LEFS by at least 9 points to meet the MDC  4. Patient will demonstrate improved quadriceps flexibility by having a negative Ely's test to reduce knee tension and improve functional mobility.   5. Patient will tolerate golfing a full round of 18 w/o a return of familiar symptoms to demonstrate a return to PLOF.                     Plan of care was developed with input and agreement by the patient      Chevy Alfonso, PT, ATC

## 2025-01-06 ENCOUNTER — OFFICE VISIT (OUTPATIENT)
Dept: ORTHOPEDIC SURGERY | Facility: CLINIC | Age: 65
End: 2025-01-06
Payer: MEDICARE

## 2025-02-15 DIAGNOSIS — M25.462 KNEE EFFUSION, LEFT: ICD-10-CM

## 2025-02-15 DIAGNOSIS — Z96.653 CHRONIC KNEE PAIN AFTER TOTAL REPLACEMENT OF BOTH KNEE JOINTS: ICD-10-CM

## 2025-02-15 DIAGNOSIS — M25.562 CHRONIC KNEE PAIN AFTER TOTAL REPLACEMENT OF BOTH KNEE JOINTS: ICD-10-CM

## 2025-02-15 DIAGNOSIS — M76.32 IT BAND SYNDROME, LEFT: ICD-10-CM

## 2025-02-15 DIAGNOSIS — G89.28 CHRONIC KNEE PAIN AFTER TOTAL REPLACEMENT OF BOTH KNEE JOINTS: ICD-10-CM

## 2025-02-15 DIAGNOSIS — M76.31 ILIOTIBIAL BAND TENDONITIS, RIGHT: ICD-10-CM

## 2025-02-15 DIAGNOSIS — M25.561 CHRONIC KNEE PAIN AFTER TOTAL REPLACEMENT OF BOTH KNEE JOINTS: ICD-10-CM

## 2025-02-15 DIAGNOSIS — M25.461 KNEE EFFUSION, RIGHT: ICD-10-CM

## 2025-02-24 RX ORDER — NAPROXEN 500 MG/1
500 TABLET ORAL
Qty: 60 TABLET | Refills: 1 | Status: SHIPPED | OUTPATIENT
Start: 2025-02-24 | End: 2025-04-25

## 2025-03-27 ENCOUNTER — TELEPHONE (OUTPATIENT)
Dept: ORTHOPEDIC SURGERY | Facility: CLINIC | Age: 65
End: 2025-03-27

## 2025-03-27 ENCOUNTER — OFFICE VISIT (OUTPATIENT)
Dept: ORTHOPEDIC SURGERY | Facility: CLINIC | Age: 65
End: 2025-03-27
Payer: MEDICARE

## 2025-03-27 VITALS — HEIGHT: 72 IN | BODY MASS INDEX: 36.57 KG/M2 | WEIGHT: 270 LBS

## 2025-03-27 DIAGNOSIS — M65.332 TRIGGER FINGER, LEFT MIDDLE FINGER: Primary | ICD-10-CM

## 2025-03-27 DIAGNOSIS — G56.03 BILATERAL CARPAL TUNNEL SYNDROME: ICD-10-CM

## 2025-03-27 PROCEDURE — 20526 THER INJECTION CARP TUNNEL: CPT | Mod: 50 | Performed by: PHYSICIAN ASSISTANT

## 2025-03-27 PROCEDURE — 20550 NJX 1 TENDON SHEATH/LIGAMENT: CPT | Mod: RT | Performed by: PHYSICIAN ASSISTANT

## 2025-03-27 PROCEDURE — 99213 OFFICE O/P EST LOW 20 MIN: CPT | Mod: 25 | Performed by: PHYSICIAN ASSISTANT

## 2025-03-27 PROCEDURE — 20550 NJX 1 TENDON SHEATH/LIGAMENT: CPT | Mod: 50 | Performed by: PHYSICIAN ASSISTANT

## 2025-03-27 PROCEDURE — 2500000004 HC RX 250 GENERAL PHARMACY W/ HCPCS (ALT 636 FOR OP/ED): Performed by: PHYSICIAN ASSISTANT

## 2025-03-27 RX ORDER — LIDOCAINE HYDROCHLORIDE 10 MG/ML
0.5 INJECTION, SOLUTION INFILTRATION; PERINEURAL
Status: COMPLETED | OUTPATIENT
Start: 2025-03-27 | End: 2025-03-27

## 2025-03-27 RX ORDER — TRIAMCINOLONE ACETONIDE 40 MG/ML
20 INJECTION, SUSPENSION INTRA-ARTICULAR; INTRAMUSCULAR
Status: COMPLETED | OUTPATIENT
Start: 2025-03-27 | End: 2025-03-27

## 2025-03-27 RX ADMIN — LIDOCAINE HYDROCHLORIDE 0.5 ML: 10 INJECTION, SOLUTION INFILTRATION; PERINEURAL at 15:10

## 2025-03-27 RX ADMIN — TRIAMCINOLONE ACETONIDE 20 MG: 400 INJECTION, SUSPENSION INTRA-ARTICULAR; INTRAMUSCULAR at 15:10

## 2025-03-27 ASSESSMENT — PAIN DESCRIPTION - DESCRIPTORS: DESCRIPTORS: ACHING;SORE

## 2025-03-27 ASSESSMENT — PAIN SCALES - GENERAL: PAINLEVEL_OUTOF10: 5 - MODERATE PAIN

## 2025-03-27 ASSESSMENT — PAIN - FUNCTIONAL ASSESSMENT: PAIN_FUNCTIONAL_ASSESSMENT: 0-10

## 2025-03-27 NOTE — PROGRESS NOTES
Patient returns to clinic today for follow-up of bilateral hands.  He has previously seen Dr. Young for trigger finger as well as carpal tunnel syndrome.  He was last seen on 5/6/2024 at that time he was diagnosed with bilateral carpal tunnel syndrome and given bilateral carpal tunnel steroid injections and bilateral middle finger trigger fingers.  He has noticed recurrence and increase severity of his symptoms specifically burning and tingling unable to sleep more than 2 hours at nighttime right is more significant than his left also noticing recurrent pain at the base of the bilateral middle fingers and triggering of his right small finger.  He does golf and this is been difficult for him.  He does normally stay in [place] in the winter months and upon returning back to [place] has noticed recurrence of the symptoms.    Past medical history, medications, allergies, surgical history and review of systems have been reviewed with the patient. Pertinent changes are documented in the HPI. Otherwise they are unchanged when compared to last visit on 5/6/2024 with Dr. Young.    Physical Examination Findings:  Constitutional: Appears well-developed and well-nourished.  Head: Normocephalic and atraumatic.  Eyes: Pupils are equal and round.  Cardiovascular: Intact distal pulses.   Respiratory: Effort normal. No respiratory distress.  Neurologic: Alert and oriented to person, place, and time.  Skin: Skin is warm and dry.  Hematologic / Lymphatic: No lymphedema, lymphangitis.  Psychiatric: normal mood and affect. Behavior is normal.   Musculoskeletal: Bilateral hands with mild palmar swelling specifically of the right hand.  Significant tenderness over the A1 pulleys of the right and left middle finger tenderness to palpation over the A1 pulley of the right small finger active triggering on exam today of the right small finger.  Positive Nereida median nerve compression test and positive Tinel signs bilaterally right greater  than left.  No thenar atrophy    Impression: Bilateral carpal tunnel syndrome multiple trigger finger    Plan: We again have discussed these diagnoses.  I have discussed treatment options with him.  We did discuss that surgery is a more definitive treatment for this.  At this time he would like to proceed with steroid injections and possibly schedule surgery in September or October before returning back to [place].    Patient ID: Asif Villarreal is a 64 y.o. male.    Hand / UE Inj/Asp: bilateral carpal tunnel for carpal tunnel syndrome on 3/27/2025 3:10 PM  Indications: therapeutic  Details: 25 G needle  Medications (Right): 0.5 mL lidocaine 10 mg/mL (1 %); 20 mg triamcinolone acetonide 40 mg/mL  Medications (Left): 20 mg triamcinolone acetonide 40 mg/mL; 0.5 mL lidocaine 10 mg/mL (1 %)  Procedure, treatment alternatives, risks and benefits explained, specific risks discussed. Immediately prior to procedure a time out was called to verify the correct patient, procedure, equipment, support staff and site/side marked as required.       Hand / UE Inj/Asp: bilateral long A1 for trigger finger on 3/27/2025 3:10 PM  Details: 25 G needle  Procedure, treatment alternatives, risks and benefits explained, specific risks discussed. Immediately prior to procedure a time out was called to verify the correct patient, procedure, equipment, support staff and site/side marked as required.       Hand / UE Inj/Asp: R small A1 for trigger finger on 3/27/2025 3:10 PM  Details: 25 G needle  Medications: 20 mg triamcinolone acetonide 40 mg/mL; 0.5 mL lidocaine 10 mg/mL (1 %)  Procedure, treatment alternatives, risks and benefits explained, specific risks discussed. Immediately prior to procedure a time out was called to verify the correct patient, procedure, equipment, support staff and site/side marked as required.         For Surgical Planning:  Diagnosis: Bilateral carpal tunnel syndrome, trigger finger  Procedure: Right carpal tunnel  decompression, trigger finger release middle and small finger  CPT: 04172, 84370  Anesthesia: MAC  Duration: 30 minutes  Special Equipment Needed: None  Location: Any  Initial Post Operative Visit: 2 weeks      Tianna Evans PA-C  Department of Orthopaedic Surgery  [facility]    Dictation performed with the use of voice recognition software. Syntax and grammatical errors may exist.

## 2025-03-27 NOTE — TELEPHONE ENCOUNTER
Copied from CRM #[id number]. Topic: Appointment Scheduled  >> Mar 27, 2025  8:08 AM Oralia BASILIO wrote:  Same day Appointment Scheduled for Patient.

## 2025-04-07 ENCOUNTER — APPOINTMENT (OUTPATIENT)
Dept: ORTHOPEDIC SURGERY | Facility: CLINIC | Age: 65
End: 2025-04-07
Payer: MEDICARE

## 2025-04-17 ENCOUNTER — OFFICE VISIT (OUTPATIENT)
Dept: ORTHOPEDIC SURGERY | Facility: CLINIC | Age: 65
End: 2025-04-17
Payer: MEDICARE

## 2025-04-17 DIAGNOSIS — Z96.653 CHRONIC KNEE PAIN AFTER TOTAL REPLACEMENT OF BOTH KNEE JOINTS: Primary | ICD-10-CM

## 2025-04-17 DIAGNOSIS — M25.562 CHRONIC KNEE PAIN AFTER TOTAL REPLACEMENT OF BOTH KNEE JOINTS: Primary | ICD-10-CM

## 2025-04-17 DIAGNOSIS — M25.561 CHRONIC KNEE PAIN AFTER TOTAL REPLACEMENT OF BOTH KNEE JOINTS: Primary | ICD-10-CM

## 2025-04-17 DIAGNOSIS — G89.28 CHRONIC KNEE PAIN AFTER TOTAL REPLACEMENT OF BOTH KNEE JOINTS: Primary | ICD-10-CM

## 2025-04-17 PROCEDURE — 99213 OFFICE O/P EST LOW 20 MIN: CPT | Performed by: ORTHOPAEDIC SURGERY

## 2025-04-17 PROCEDURE — 1159F MED LIST DOCD IN RCRD: CPT | Performed by: ORTHOPAEDIC SURGERY

## 2025-04-17 PROCEDURE — 1036F TOBACCO NON-USER: CPT | Performed by: ORTHOPAEDIC SURGERY

## 2025-04-17 NOTE — PROGRESS NOTES
Patient is a 64-year-old gentleman who presents today for further evaluation of bilateral total knee replacements.  He struggled with CRPS in the left.  He says today that this is the best he has felt in the last 3 years.  He has an episode about every 3 months where he has some laterally based pain on the left.    Bilateral knees:  AAOx3, NAD, walks with a non-antalgic gait  Neutral allignment  Range of motion 0-110  Stable to varus/valgus/anterior/posterior stress through out the range of motion  No no joint line tenderness to palpation  No effusion  SILT in a fransisco/saph/per/tib distribution  5/5 knee extension/df/pf/ehl  ½ dorsalis pedis and posterior tibial pulse  no popliteal lymphadenopathy  no other overlying lesions  mood: euthymic  Respirations non labored    X-rays reviewed by myself today in clinic reveal excellent alignment of the total knee arthroplasty components with no signs of early loosening or failure.    We discussed continued observation today in clinic.  Overall he is doing well currently.  Will see him back in a year with repeat x-rays.  If his symptoms change he will come back to see us sooner.  All of his questions were answered.  Continue activity as tolerated.    This note was created using voice recognition software and was not corrected for typographical or grammatical errors.

## 2025-05-03 DIAGNOSIS — G89.28 CHRONIC KNEE PAIN AFTER TOTAL REPLACEMENT OF BOTH KNEE JOINTS: ICD-10-CM

## 2025-05-03 DIAGNOSIS — Z96.653 CHRONIC KNEE PAIN AFTER TOTAL REPLACEMENT OF BOTH KNEE JOINTS: ICD-10-CM

## 2025-05-03 DIAGNOSIS — M25.561 CHRONIC KNEE PAIN AFTER TOTAL REPLACEMENT OF BOTH KNEE JOINTS: ICD-10-CM

## 2025-05-03 DIAGNOSIS — M25.461 KNEE EFFUSION, RIGHT: ICD-10-CM

## 2025-05-03 DIAGNOSIS — M76.31 ILIOTIBIAL BAND TENDONITIS, RIGHT: ICD-10-CM

## 2025-05-03 DIAGNOSIS — M76.32 IT BAND SYNDROME, LEFT: ICD-10-CM

## 2025-05-03 DIAGNOSIS — M25.562 CHRONIC KNEE PAIN AFTER TOTAL REPLACEMENT OF BOTH KNEE JOINTS: ICD-10-CM

## 2025-05-03 DIAGNOSIS — M25.462 KNEE EFFUSION, LEFT: ICD-10-CM

## 2025-05-06 RX ORDER — NAPROXEN 500 MG/1
500 TABLET ORAL
Qty: 60 TABLET | Refills: 1 | Status: SHIPPED | OUTPATIENT
Start: 2025-05-06 | End: 2025-07-05

## 2025-07-05 NOTE — PROGRESS NOTES
[facility]  Hand and Upper Extremity Service  Follow up visit         New Problem: Bilateral hands      Interval History: He returns for his bilateral hands. He was last seen by Tianna Evans and bilateral carpal tunnel, bilateral middle finger trigger injections, and right small finger trigger injection at last visit and did well. Currently his left hand is asymptomatic right now but he has recurrence of the right middle finger triggering and right carpal tunnel symptoms. His trigger finger is interfering with his golf and he realizes now that this is something he needs to definitively take care of but he wants to wait until September and is requesting injection today.               Past medical history, medications, allergies, surgical history and review of systems are reviewed and otherwise unchanged when compared to last visit on 3/27/25         Examination:  Constitutional: Oriented to person, place, and time.  Appears well-developed and well-nourished.  Head: Normocephalic and atraumatic.  Eyes: Pupils are equal, round, and reactive to light.  Cardiovascular: Intact distal pulses.  Pulmonary/Chest/Breast: Effort normal. No respiratory distress.  Neurological: Alert and oriented to person, place, and time.  Skin: Skin is warm and dry.  Psychiatric: normal mood and affect.  Behavior is normal.  Musculoskeletal: Right hand reveals no thenar atrophy. Burning, numbness, and tingling in the median nerve distribution. Tenderness to palpation middle finger A1 pulley and triggering with terminal finger flexion. Trigger finger of small finger has resolved following injection in March.        Impression: Right carpal tunnel syndrome and right middle finger trigger finger      Plan: We gave him a right carpal tunnel and right middle finger trigger injection today. He will contact my office to schedule right carpal tunnel and right middle finger trigger release later in the fall under iv  sedation.       In Office Procedures Performed: Right carpal tunnel injection and right middle finger trigger injection   Hand / UE Inj/Asp: R carpal tunnel for carpal tunnel syndrome on 7/7/2025 8:47 AM  Indications: pain and therapeutic  Details: 25 G needle, volar approach  Medications: 20 mg triamcinolone acetonide 40 mg/mL; 0.5 mL lidocaine 10 mg/mL (1 %)  Outcome: tolerated well, no immediate complications  Procedure, treatment alternatives, risks and benefits explained, specific risks discussed. Consent was given by the patient. Immediately prior to procedure a time out was called to verify the correct patient, procedure, equipment, support staff and site/side marked as required. Patient was prepped and draped in the usual sterile fashion.       Hand / UE Inj/Asp: R long A1 for trigger finger on 7/7/2025 8:47 AM  Indications: tendon swelling and pain  Details: 25 G needle, volar approach  Medications: 20 mg triamcinolone acetonide 40 mg/mL; 0.5 mL lidocaine 10 mg/mL (1 %)  Outcome: tolerated well, no immediate complications  Procedure, treatment alternatives, risks and benefits explained, specific risks discussed. Consent was given by the patient. Immediately prior to procedure a time out was called to verify the correct patient, procedure, equipment, support staff and site/side marked as required. Patient was prepped and draped in the usual sterile fashion.       Follow up: Will call to schedule surgery        We discussed risks, benefits, alternatives and anticipated post op course including time away from work and activities following surgical treatment for the patient's condition. This is major surgery with identifiable risks. No guarantees for success have been provided. The patient has expressed understanding and has elected to pursue operative treatment.        For Surgical Planning:  Diagnosis: Right carpal tunnel syndrome, right middle finger trigger finger  Procedure: Right carpal tunnel release,  right middle finger trigger release  CPT: 41366, 53951  Anesthesia: MAC  Duration: 30 minutes  Special Equipment Needed: None  Medical Notes / PM / DM / PAT needed?:  PAT requested  Location: [facility] or [facility]  Initial Post Operative Visit: 2 weeks          Fadi Young MD  [facility]  Department of Orthopaedic Surgery  Hand and Upper Extremity Reconstruction    Scribe Attestation  By signing my name below, ISonia , Scribe   attest that this documentation has been prepared under the direction and in the presence of Dr. Fadi Young.    Dictation performed with the use of voice recognition software.  Syntax and grammatical errors may exist.

## 2025-07-07 ENCOUNTER — APPOINTMENT (OUTPATIENT)
Dept: ORTHOPEDIC SURGERY | Facility: CLINIC | Age: 65
End: 2025-07-07
Payer: MEDICARE

## 2025-07-07 VITALS — HEIGHT: 72 IN | BODY MASS INDEX: 36.57 KG/M2 | WEIGHT: 270 LBS

## 2025-07-07 DIAGNOSIS — M65.331 TRIGGER FINGER, RIGHT MIDDLE FINGER: ICD-10-CM

## 2025-07-07 DIAGNOSIS — G56.01 RIGHT CARPAL TUNNEL SYNDROME: Primary | ICD-10-CM

## 2025-07-07 RX ORDER — TRIAMCINOLONE ACETONIDE 40 MG/ML
20 INJECTION, SUSPENSION INTRA-ARTICULAR; INTRAMUSCULAR
Status: COMPLETED | OUTPATIENT
Start: 2025-07-07 | End: 2025-07-07

## 2025-07-07 RX ORDER — LIDOCAINE HYDROCHLORIDE 10 MG/ML
0.5 INJECTION, SOLUTION INFILTRATION; PERINEURAL
Status: COMPLETED | OUTPATIENT
Start: 2025-07-07 | End: 2025-07-07

## 2025-07-07 RX ADMIN — LIDOCAINE HYDROCHLORIDE 0.5 ML: 10 INJECTION, SOLUTION INFILTRATION; PERINEURAL at 08:47

## 2025-07-07 RX ADMIN — TRIAMCINOLONE ACETONIDE 20 MG: 40 INJECTION, SUSPENSION INTRA-ARTICULAR; INTRAMUSCULAR at 08:47

## 2025-07-07 ASSESSMENT — PAIN DESCRIPTION - DESCRIPTORS: DESCRIPTORS: ACHING;SORE;TINGLING;NUMBNESS

## 2025-07-07 ASSESSMENT — PAIN - FUNCTIONAL ASSESSMENT: PAIN_FUNCTIONAL_ASSESSMENT: 0-10

## 2025-07-07 ASSESSMENT — PAIN SCALES - GENERAL: PAINLEVEL_OUTOF10: 5 - MODERATE PAIN

## 2025-07-21 ENCOUNTER — TELEPHONE (OUTPATIENT)
Dept: ORTHOPEDIC SURGERY | Facility: CLINIC | Age: 65
End: 2025-07-21
Payer: MEDICARE
